# Patient Record
Sex: FEMALE | Race: WHITE | NOT HISPANIC OR LATINO | Employment: OTHER | URBAN - METROPOLITAN AREA
[De-identification: names, ages, dates, MRNs, and addresses within clinical notes are randomized per-mention and may not be internally consistent; named-entity substitution may affect disease eponyms.]

---

## 2017-02-01 ENCOUNTER — ALLIED HEALTH/NURSE VISIT (OUTPATIENT)
Dept: NURSING | Facility: CLINIC | Age: 82
End: 2017-02-01
Payer: COMMERCIAL

## 2017-02-01 ENCOUNTER — TELEPHONE (OUTPATIENT)
Dept: FAMILY MEDICINE | Facility: CLINIC | Age: 82
End: 2017-02-01

## 2017-02-01 DIAGNOSIS — Z11.1 SCREENING FOR TUBERCULOSIS: Primary | ICD-10-CM

## 2017-02-01 PROCEDURE — 86580 TB INTRADERMAL TEST: CPT

## 2017-02-01 PROCEDURE — 99207 ZZC NO CHARGE NURSE ONLY: CPT

## 2017-02-01 NOTE — NURSING NOTE
The following medication was given:     MEDICATION: Tuberculin Purified Protein  ROUTE: ID  SITE: Forearm - Left  DOSE: 0.1 mL  LOT #: [id number]  :  [employer]  EXPIRATION DATE:  01/06/2018  NDC#: 61636-973-34

## 2017-02-01 NOTE — TELEPHONE ENCOUNTER
Patient dropped off forms for assisted living.  Please call patient when completed and after faxed.  Forms placed in provider's in basket.  LINNEA Bower CMA February 1, 2017 3:07 PM

## 2017-02-02 NOTE — TELEPHONE ENCOUNTER
I'm just waiting for her to have her PPD read on Friday and then we can complete for forms.  They are on my desk in yellow folder so when she comes in tomorrow for PPD to be read, that part can be completed and then pt can take forms after we make copy for chart.

## 2017-02-02 NOTE — TELEPHONE ENCOUNTER
Patient daughter (Kaycee) call and would like a call back to make sure the correct forms were drought in. Her number is [phone]

## 2017-02-03 ENCOUNTER — ALLIED HEALTH/NURSE VISIT (OUTPATIENT)
Dept: NURSING | Facility: CLINIC | Age: 82
End: 2017-02-03
Payer: COMMERCIAL

## 2017-02-03 DIAGNOSIS — Z11.1 SCREENING EXAMINATION FOR PULMONARY TUBERCULOSIS: Primary | ICD-10-CM

## 2017-02-03 LAB
PPDINDURATION: 0 MM (ref 0–5)
PPDREDNESS: NORMAL MM

## 2017-02-03 PROCEDURE — 99207 ZZC NO CHARGE NURSE ONLY: CPT

## 2017-05-11 ENCOUNTER — TELEPHONE (OUTPATIENT)
Dept: FAMILY MEDICINE | Facility: CLINIC | Age: 82
End: 2017-05-11

## 2017-05-11 DIAGNOSIS — Z71.89 ADVANCED DIRECTIVES, COUNSELING/DISCUSSION: Primary | ICD-10-CM

## 2017-05-11 DIAGNOSIS — Z71.89 ACP (ADVANCE CARE PLANNING): ICD-10-CM

## 2017-05-11 NOTE — TELEPHONE ENCOUNTER
Reason for Call:  Other forms    Detailed comments: patient just moved into senior living. daughter Dimple dropped off 2 forms at the clinic on Wednesday, one for  ok to participate in fitness classes and the other a POLST form(she thinks) she wants to pick them up by tomorrow as she is leaving town. Please advise.     Phone Number Patient can be reached at: Other phone number:  [phone] # april Musa     Best Time: asap    Can we leave a detailed message on this number? YES    Call taken on 5/11/2017 at 4:11 PM by Leila Arce

## 2017-05-11 NOTE — TELEPHONE ENCOUNTER
Forms dropped with Dianelys Demarco so POLST can be discussed  Please see if Kathy can complete tomorrow

## 2017-05-12 PROBLEM — Z71.89 ACP (ADVANCE CARE PLANNING): Status: ACTIVE | Noted: 2017-05-12

## 2017-05-12 NOTE — TELEPHONE ENCOUNTER
Called daughter to discuss, completed polst, and Jennifer Weiss signed.  Left at  for daughter to .  Placed Full code order.  Updated Problem list  Kathy Dawn RN

## 2017-08-30 ENCOUNTER — DOCUMENTATION ONLY (OUTPATIENT)
Dept: OTHER | Facility: CLINIC | Age: 82
End: 2017-08-30

## 2017-08-30 DIAGNOSIS — Z71.89 ACP (ADVANCE CARE PLANNING): ICD-10-CM

## 2017-11-30 ENCOUNTER — OFFICE VISIT (OUTPATIENT)
Dept: FAMILY MEDICINE | Facility: CLINIC | Age: 82
End: 2017-11-30
Payer: COMMERCIAL

## 2017-11-30 VITALS
DIASTOLIC BLOOD PRESSURE: 60 MMHG | BODY MASS INDEX: 19.54 KG/M2 | HEIGHT: 62 IN | WEIGHT: 106.2 LBS | RESPIRATION RATE: 18 BRPM | TEMPERATURE: 97.9 F | SYSTOLIC BLOOD PRESSURE: 117 MMHG | HEART RATE: 66 BPM | OXYGEN SATURATION: 99 %

## 2017-11-30 DIAGNOSIS — R63.4 UNINTENDED WEIGHT LOSS: ICD-10-CM

## 2017-11-30 DIAGNOSIS — R30.0 DYSURIA: Primary | ICD-10-CM

## 2017-11-30 DIAGNOSIS — J06.9 UPPER RESPIRATORY TRACT INFECTION, UNSPECIFIED TYPE: ICD-10-CM

## 2017-11-30 LAB
ALBUMIN UR-MCNC: 30 MG/DL
APPEARANCE UR: ABNORMAL
BACTERIA #/AREA URNS HPF: ABNORMAL /HPF
BILIRUB UR QL STRIP: NEGATIVE
COLOR UR AUTO: YELLOW
ERYTHROCYTE [DISTWIDTH] IN BLOOD BY AUTOMATED COUNT: 12.2 % (ref 10–15)
GLUCOSE UR STRIP-MCNC: NEGATIVE MG/DL
HCT VFR BLD AUTO: 37.2 % (ref 35–47)
HGB BLD-MCNC: 11.8 G/DL (ref 11.7–15.7)
HGB UR QL STRIP: ABNORMAL
KETONES UR STRIP-MCNC: NEGATIVE MG/DL
LEUKOCYTE ESTERASE UR QL STRIP: ABNORMAL
MCH RBC QN AUTO: 33.4 PG (ref 26.5–33)
MCHC RBC AUTO-ENTMCNC: 31.7 G/DL (ref 31.5–36.5)
MCV RBC AUTO: 105 FL (ref 78–100)
NITRATE UR QL: POSITIVE
PH UR STRIP: 5.5 PH (ref 5–7)
PLATELET # BLD AUTO: 200 10E9/L (ref 150–450)
RBC # BLD AUTO: 3.53 10E12/L (ref 3.8–5.2)
RBC #/AREA URNS AUTO: >100 /HPF
SOURCE: ABNORMAL
SP GR UR STRIP: 1.02 (ref 1–1.03)
UROBILINOGEN UR STRIP-ACNC: 0.2 EU/DL (ref 0.2–1)
WBC # BLD AUTO: 5.7 10E9/L (ref 4–11)
WBC #/AREA URNS AUTO: >100 /HPF

## 2017-11-30 PROCEDURE — 87088 URINE BACTERIA CULTURE: CPT | Performed by: PHYSICIAN ASSISTANT

## 2017-11-30 PROCEDURE — 85027 COMPLETE CBC AUTOMATED: CPT | Performed by: PHYSICIAN ASSISTANT

## 2017-11-30 PROCEDURE — 80048 BASIC METABOLIC PNL TOTAL CA: CPT | Performed by: PHYSICIAN ASSISTANT

## 2017-11-30 PROCEDURE — 87186 SC STD MICRODIL/AGAR DIL: CPT | Performed by: PHYSICIAN ASSISTANT

## 2017-11-30 PROCEDURE — 36415 COLL VENOUS BLD VENIPUNCTURE: CPT | Performed by: PHYSICIAN ASSISTANT

## 2017-11-30 PROCEDURE — 87086 URINE CULTURE/COLONY COUNT: CPT | Performed by: PHYSICIAN ASSISTANT

## 2017-11-30 PROCEDURE — 99214 OFFICE O/P EST MOD 30 MIN: CPT | Performed by: PHYSICIAN ASSISTANT

## 2017-11-30 PROCEDURE — 84443 ASSAY THYROID STIM HORMONE: CPT | Performed by: PHYSICIAN ASSISTANT

## 2017-11-30 PROCEDURE — 81001 URINALYSIS AUTO W/SCOPE: CPT | Performed by: PHYSICIAN ASSISTANT

## 2017-11-30 RX ORDER — SULFAMETHOXAZOLE/TRIMETHOPRIM 800-160 MG
1 TABLET ORAL 2 TIMES DAILY
Qty: 14 TABLET | Refills: 0 | Status: SHIPPED | OUTPATIENT
Start: 2017-11-30 | End: 2018-05-01

## 2017-11-30 NOTE — MR AVS SNAPSHOT
"              After Visit Summary   2017    Selena Jacobo    MRN: [id number]           Patient Information     Date Of Birth          1930        Visit Information        Provider Department      2017 12:30 PM Tana Garcia PA-C [facility]        Today's Diagnoses     Dysuria    -  1    Unintended weight loss        Upper respiratory tract infection, unspecified type           Follow-ups after your visit        Who to contact     If you have questions or need follow up information about today's clinic visit or your schedule please contact [facility] directly at [phone].  Normal or non-critical lab and imaging results will be communicated to you by Thoundshart, letter or phone within 4 business days after the clinic has received the results. If you do not hear from us within 7 days, please contact the clinic through Thoundshart or phone. If you have a critical or abnormal lab result, we will notify you by phone as soon as possible.  Submit refill requests through PlayHaven or call your pharmacy and they will forward the refill request to us. Please allow 3 business days for your refill to be completed.          Additional Information About Your Visit        MyChart Information     PlayHaven lets you send messages to your doctor, view your test results, renew your prescriptions, schedule appointments and more. To sign up, go to [email or web address] . Click on \"Log in\" on the left side of the screen, which will take you to the Welcome page. Then click on \"Sign up Now\" on the right side of the page.     You will be asked to enter the access code listed below, as well as some personal information. Please follow the directions to create your username and password.     Your access code is: [email or web address]  Expires: 2018  1:03 PM     Your access code will  in 90 days. If you need help or a new code, please call your [facility] or [phone].        Care EveryWhere ID  " "   This is your Care EveryWhere ID. This could be used by other organizations to access your [facility] medical records  [id number]        Your Vitals Were     Pulse Temperature Respirations Height Pulse Oximetry BMI (Body Mass Index)    66 97.9  F (36.6  C) (Tympanic) 18 5' 2.4\" (1.585 m) 99% 19.17 kg/m2       Blood Pressure from Last 3 Encounters:   11/30/17 117/60   12/08/16 123/63   06/25/15 148/65    Weight from Last 3 Encounters:   11/30/17 106 lb 3.2 oz (48.2 kg)   12/08/16 116 lb 1.6 oz (52.7 kg)   06/25/15 118 lb (53.5 kg)              We Performed the Following     *UA reflex to Microscopic and Culture ([place] [facility] (except [place] and [place])     Basic metabolic panel     CBC with platelets     TSH with free T4 reflex     Urine Culture Aerobic Bacterial     Urine Microscopic          Today's Medication Changes          These changes are accurate as of: 11/30/17  1:03 PM.  If you have any questions, ask your nurse or doctor.               Start taking these medicines.        Dose/Directions    sulfamethoxazole-trimethoprim 800-160 MG per tablet   Commonly known as:  BACTRIM DS/SEPTRA DS   Used for:  Dysuria   Started by:  Tana Garcia PA-C        Dose:  1 tablet   Take 1 tablet by mouth 2 times daily   Quantity:  14 tablet   Refills:  0            Where to get your medicines      These medications were sent to [facility] [address] [place] [address] SYD DONALD  [address] AT [address] [place] [address] & [facility]  [address] [address]    Hours:  24-hours Phone:  [phone]     sulfamethoxazole-trimethoprim 800-160 MG per tablet                Primary Care Provider Office Phone # Fax #    Tana Garcia PA-C [phone] [address]  [place] [address]        Equal Access to Services     [facility] ETHAN AH: Ivon pearce Soaquiles, waaxda luqadaha, qaybta kaalmada adeegyamalena, vicki parish ah. So [facility] [phone].    ATENCIÓN: " Si habla dacia, tiene a craft disposición servicios gratuitos de asistencia lingüística. Mike vilchis [phone].    We comply with applicable federal civil rights laws and [place] laws. We do not discriminate on the basis of race, color, national origin, age, disability, sex, sexual orientation, or gender identity.            Thank you!     Thank you for choosing [facility]  for your care. Our goal is always to provide you with excellent care. Hearing back from our patients is one way we can continue to improve our services. Please take a few minutes to complete the written survey that you may receive in the mail after your visit with us. Thank you!             Your Updated Medication List - Protect others around you: Learn how to safely use, store and throw away your medicines at www.disposemymeds.org.          This list is accurate as of: 11/30/17  1:03 PM.  Always use your most recent med list.                   Brand Name Dispense Instructions for use Diagnosis    aspirin 81 MG tablet     30 tablet    Take 1 tablet (81 mg) by mouth daily        CENTRUM SILVER per tablet      Take 1 tablet by mouth daily        FISH OIL PO      Take  by mouth.        magnesium 250 MG tablet      Take 1 tablet by mouth daily        sulfamethoxazole-trimethoprim 800-160 MG per tablet    BACTRIM DS/SEPTRA DS    14 tablet    Take 1 tablet by mouth 2 times daily    Dysuria       VITAMIN D PO      Take  by mouth.

## 2017-11-30 NOTE — PROGRESS NOTES
"HPI: Pt moved to [facility] in April  Here with dtr Dimple  Her wt is down to 106lbs from 116lbs last year.  She has had a cold and cough x 2-3 weeks.  Yesterday had urinary dysuria and freq  That feels better today  Dtr  age 66 yo brain ca in August of this year.  She has one Ensure per day  She is walking 2 miles per day  Denies fever  Cough is mostly nonprod  She has more of a runny nose  Denies sob or pain in chest  She didn't want to try Mucinex DM  She does eat a banana per day.    Past Medical History:   Diagnosis Date     Generalized OA      GERD (gastroesophageal reflux disease)      Hyperlipidemia      Osteoporosis      Rotator cuff tear      UPJ stricture, acquired     L retrograde uterteogram, balloon dilation     Past Surgical History:   Procedure Laterality Date     CATARACT IOL, RT/LT       Social History   Substance Use Topics     Smoking status: Never Smoker     Smokeless tobacco: Never Used     Alcohol use 0.0 oz/week     0 Standard drinks or equivalent per week      Comment: rare glass of wine     Current Outpatient Prescriptions   Medication Sig Dispense Refill     sulfamethoxazole-trimethoprim (BACTRIM DS/SEPTRA DS) 800-160 MG per tablet Take 1 tablet by mouth 2 times daily 14 tablet 0     Multiple Vitamins-Minerals (CENTRUM SILVER) per tablet Take 1 tablet by mouth daily       aspirin 81 MG tablet Take 1 tablet (81 mg) by mouth daily 30 tablet      magnesium 250 MG tablet Take 1 tablet by mouth daily       Omega-3 Fatty Acids (FISH OIL PO) Take  by mouth.       Cholecalciferol (VITAMIN D PO) Take  by mouth.       Allergies   Allergen Reactions     Fosamax [Alendronate Sodium]      Penicillins      fever     FAMILY HISTORY NOTED AND REVIEWED  PHYSICAL EXAM:    /60 (BP Location: Right arm, Patient Position: Chair, Cuff Size: Adult Regular)  Pulse 66  Temp 97.9  F (36.6  C) (Tympanic)  Resp 18  Ht 5' 2.4\" (1.585 m)  Wt 106 lb 3.2 oz (48.2 kg)  SpO2 99%  BMI 19.17 " "kg/m2    Patient appears non toxic  Thin, alert, oriented, walks independently  Ears: TMs pearly grey  Throat: no erythema or exudate  Lungs: CTA bilat  Heart: RRR  Extr: no edema.  Psych: appears down    Results for orders placed or performed in visit on 11/30/17   *UA reflex to Microscopic and Culture ([facility] (except [place] and [place])   Result Value Ref Range    Color Urine Yellow     Appearance Urine Cloudy     Glucose Urine Negative NEG^Negative mg/dL    Bilirubin Urine Negative NEG^Negative    Ketones Urine Negative NEG^Negative mg/dL    Specific Gravity Urine 1.020 1.003 - 1.035    Blood Urine Moderate (A) NEG^Negative    pH Urine 5.5 5.0 - 7.0 pH    Protein Albumin Urine 30 (A) NEG^Negative mg/dL    Urobilinogen Urine 0.2 0.2 - 1.0 EU/dL    Nitrite Urine Positive (A) NEG^Negative    Leukocyte Esterase Urine Large (A) NEG^Negative    Source Midstream Urine    Urine Microscopic   Result Value Ref Range    WBC Urine >100 (A) OTO2^O - 2 /HPF    RBC Urine >100 (A) OTO2^O - 2 /HPF    Bacteria Urine Many (A) NEG^Negative /HPF     Assessment and Plan:     (R30.0) Dysuria  (primary encounter diagnosis)  Comment: UA positive.  Start bactrim ds one bid  7d. Push fluids. UC sent.  Plan: *UA reflex to Microscopic and Culture ([facility] (except [place] and         [place]), Urine Culture Aerobic Bacterial,         Urine Microscopic,         sulfamethoxazole-trimethoprim (BACTRIM         DS/SEPTRA DS) 800-160 MG per tablet            (R63.4) Unintended weight loss  Comment: suspect due to eating less. She moved and dtr passed away this year.  Pt not a \"pill person\" and doesn't want to consider medication for possible depression  Plan: TSH with free T4 reflex, Basic metabolic panel            (J06.9) Upper respiratory tract infection, unspecified type  Comment:   Plan: CBC with platelets              Tana Garcia PA-C            "

## 2017-12-01 LAB
ANION GAP SERPL CALCULATED.3IONS-SCNC: 9 MMOL/L (ref 3–14)
BUN SERPL-MCNC: 25 MG/DL (ref 7–30)
CALCIUM SERPL-MCNC: 9.2 MG/DL (ref 8.5–10.1)
CHLORIDE SERPL-SCNC: 106 MMOL/L (ref 94–109)
CO2 SERPL-SCNC: 26 MMOL/L (ref 20–32)
CREAT SERPL-MCNC: 0.67 MG/DL (ref 0.52–1.04)
GFR SERPL CREATININE-BSD FRML MDRD: 83 ML/MIN/1.7M2
GLUCOSE SERPL-MCNC: 102 MG/DL (ref 70–99)
POTASSIUM SERPL-SCNC: 4.3 MMOL/L (ref 3.4–5.3)
SODIUM SERPL-SCNC: 141 MMOL/L (ref 133–144)
TSH SERPL DL<=0.005 MIU/L-ACNC: 2.9 MU/L (ref 0.4–4)

## 2017-12-02 LAB
BACTERIA SPEC CULT: ABNORMAL
SPECIMEN SOURCE: ABNORMAL

## 2018-04-29 DIAGNOSIS — R30.0 DYSURIA: ICD-10-CM

## 2018-04-30 NOTE — TELEPHONE ENCOUNTER
"Last Written Prescription Date:  11/30/2017  Last Fill Quantity: 14,  # refills: 0   Last office visit: 11/30/2017 with prescribing provider:     Future Office Visit:      Requested Prescriptions   Pending Prescriptions Disp Refills     sulfamethoxazole-trimethoprim (BACTRIM DS/SEPTRA DS) 800-160 MG per tablet [Pharmacy Med Name: SULFAMETH//160MG TB] 14 tablet 0     Sig: TAKE 1 TABLET BY MOUTH TWICE DAILY    Oral Acne/Rosacea Medications Protocol Failed    4/30/2018 11:58 AM       Failed - Confirmation of diagnosis is required    Please confirm diagnosis is acne or rosacea.     If NOT acne or rosacea; refer request to provider for further evaluation.    If diagnosis IS acne or rosacea, OK to refill BASED ON PREVIOUS REFILL CLINICAL NOTE RECOMMENDATION.         Passed - Patient is 12 years of age or older       Passed - Recent (12 mo) or future (30 days) visit within the authorizing provider's specialty    Patient had office visit in the last 12 months or has a visit in the next 30 days with authorizing provider or within the authorizing provider's specialty.  See \"Patient Info\" tab in inbasket, or \"Choose Columns\" in Meds & Orders section of the refill encounter.           Passed - No active pregnancy on record       Passed - No positive prenancy test is past 12 months          "

## 2018-04-30 NOTE — TELEPHONE ENCOUNTER
Pt daughter derick is calling to check status of refill. Advise that refill is in process. Please advise

## 2018-05-01 ENCOUNTER — OFFICE VISIT (OUTPATIENT)
Dept: FAMILY MEDICINE | Facility: CLINIC | Age: 83
End: 2018-05-01
Payer: COMMERCIAL

## 2018-05-01 VITALS
BODY MASS INDEX: 20.38 KG/M2 | WEIGHT: 115 LBS | OXYGEN SATURATION: 98 % | DIASTOLIC BLOOD PRESSURE: 60 MMHG | TEMPERATURE: 98.8 F | HEIGHT: 63 IN | SYSTOLIC BLOOD PRESSURE: 122 MMHG | HEART RATE: 73 BPM

## 2018-05-01 DIAGNOSIS — R30.0 DYSURIA: ICD-10-CM

## 2018-05-01 DIAGNOSIS — N30.01 ACUTE CYSTITIS WITH HEMATURIA: Primary | ICD-10-CM

## 2018-05-01 LAB
ALBUMIN UR-MCNC: 100 MG/DL
APPEARANCE UR: ABNORMAL
BACTERIA #/AREA URNS HPF: ABNORMAL /HPF
BILIRUB UR QL STRIP: NEGATIVE
COLOR UR AUTO: YELLOW
GLUCOSE UR STRIP-MCNC: NEGATIVE MG/DL
HGB UR QL STRIP: ABNORMAL
KETONES UR STRIP-MCNC: NEGATIVE MG/DL
LEUKOCYTE ESTERASE UR QL STRIP: ABNORMAL
NITRATE UR QL: POSITIVE
PH UR STRIP: 5.5 PH (ref 5–7)
RBC #/AREA URNS AUTO: ABNORMAL /HPF
SOURCE: ABNORMAL
SP GR UR STRIP: 1.02 (ref 1–1.03)
UROBILINOGEN UR STRIP-ACNC: 0.2 EU/DL (ref 0.2–1)
WBC #/AREA URNS AUTO: >100 /HPF

## 2018-05-01 PROCEDURE — 87088 URINE BACTERIA CULTURE: CPT | Performed by: NURSE PRACTITIONER

## 2018-05-01 PROCEDURE — 81001 URINALYSIS AUTO W/SCOPE: CPT | Performed by: NURSE PRACTITIONER

## 2018-05-01 PROCEDURE — 87186 SC STD MICRODIL/AGAR DIL: CPT | Performed by: NURSE PRACTITIONER

## 2018-05-01 PROCEDURE — 87086 URINE CULTURE/COLONY COUNT: CPT | Performed by: NURSE PRACTITIONER

## 2018-05-01 PROCEDURE — 99213 OFFICE O/P EST LOW 20 MIN: CPT | Performed by: NURSE PRACTITIONER

## 2018-05-01 RX ORDER — SULFAMETHOXAZOLE/TRIMETHOPRIM 800-160 MG
TABLET ORAL
Refills: 0
Start: 2018-05-01

## 2018-05-01 RX ORDER — CIPROFLOXACIN 500 MG/1
500 TABLET, FILM COATED ORAL 2 TIMES DAILY
Qty: 10 TABLET | Refills: 0 | Status: SHIPPED | OUTPATIENT
Start: 2018-05-01 | End: 2018-11-12

## 2018-05-01 NOTE — PROGRESS NOTES
SUBJECTIVE:   Selena Jacobo is a 88 year old female who presents to clinic today for the following health issues:      URINARY TRACT SYMPTOMS      Duration: 3 days    Description  Dysuria, urgency    Intensity:  moderate    Accompanying signs and symptoms:  Fever/chills: no   Flank pain no   Nausea and vomiting: no   Vaginal symptoms: none  Abdominal/Pelvic Pain: no     History  History of frequent UTI's: YES  History of kidney stones: no   Sexually Active: no   Possibility of pregnancy: No    Precipitating or alleviating factors: None    Therapies tried and outcome: none   Outcome: NA      Problem list and histories reviewed & adjusted, as indicated.  Additional history: as documented    Patient Active Problem List   Diagnosis     Osteoporosis     CARDIOVASCULAR SCREENING; LDL GOAL LESS THAN 160     Generalized OA     Rotator cuff tear     UPJ stricture, acquired     GERD (gastroesophageal reflux disease)     ACP (advance care planning)     Past Surgical History:   Procedure Laterality Date     CATARACT IOL, RT/LT         Social History   Substance Use Topics     Smoking status: Never Smoker     Smokeless tobacco: Never Used     Alcohol use 0.0 oz/week     0 Standard drinks or equivalent per week      Comment: rare glass of wine     Family History   Problem Relation Age of Onset     Alzheimer Disease Mother      Brain Cancer Daughter       age 65         Current Outpatient Prescriptions   Medication Sig Dispense Refill     aspirin 81 MG tablet Take 1 tablet (81 mg) by mouth daily 30 tablet      Cholecalciferol (VITAMIN D PO) Take  by mouth.       magnesium 250 MG tablet Take 1 tablet by mouth daily       Multiple Vitamins-Minerals (CENTRUM SILVER) per tablet Take 1 tablet by mouth daily       Omega-3 Fatty Acids (FISH OIL PO) Take  by mouth.       Allergies   Allergen Reactions     Fosamax [Alendronate Sodium]      Penicillins      fever       Reviewed and updated as needed this visit by clinical staff      "  Reviewed and updated as needed this visit by Provider         ROS:  Constitutional, HEENT, cardiovascular, pulmonary, gi and gu systems are negative, except as otherwise noted.    OBJECTIVE:     /60  Pulse 73  Temp 98.8  F (37.1  C) (Oral)  Ht 5' 2.5\" (1.588 m)  Wt 115 lb (52.2 kg)  SpO2 98%  Breastfeeding? No  BMI 20.7 kg/m2  Body mass index is 20.7 kg/(m^2).  GENERAL: healthy, alert and no distress  ABDOMEN: soft, nontender, BACK: no CVA tenderness,  Diagnostic Test Results:  Results for orders placed or performed in visit on 05/01/18 (from the past 24 hour(s))   *UA reflex to Microscopic and Culture ([facility] (except [place] and [place])   Result Value Ref Range    Color Urine Yellow     Appearance Urine Cloudy     Glucose Urine Negative NEG^Negative mg/dL    Bilirubin Urine Negative NEG^Negative    Ketones Urine Negative NEG^Negative mg/dL    Specific Gravity Urine 1.020 1.003 - 1.035    Blood Urine Moderate (A) NEG^Negative    pH Urine 5.5 5.0 - 7.0 pH    Protein Albumin Urine 100 (A) NEG^Negative mg/dL    Urobilinogen Urine 0.2 0.2 - 1.0 EU/dL    Nitrite Urine Positive (A) NEG^Negative    Leukocyte Esterase Urine Moderate (A) NEG^Negative    Source Midstream Urine    Urine Microscopic   Result Value Ref Range    WBC Urine >100 (A) OTO5^0 - 5 /HPF    RBC Urine O - 2 OTO2^O - 2 /HPF    Bacteria Urine Few (A) NEG^Negative /HPF       ASSESSMENT/PLAN:       ICD-10-CM    1. Acute cystitis with hematuria N30.01 ciprofloxacin (CIPRO) 500 MG tablet   2. Dysuria R30.0 *UA reflex to Microscopic and Culture ([facility] (except [place] and [place])     Urine Microscopic     Urine Culture Aerobic Bacterial         Patient Instructions      * BLADDER INFECTION,Female (Adult)    A bladder infection (\"cystitis\" or \"UTI\") usually causes a constant urge to urinate and a burning when passing urine. Urine may be cloudy, smelly or dark. There may be pain in the lower abdomen. " A bladder infection occurs when bacteria from the vaginal area enter the bladder opening (urethra). This can occur from sexual intercourse, wearing tight clothing, dehydration and other factors.  HOME CARE:  1. Drink lots of fluids (at least 6-8 glasses a day, unless you must restrict fluids for other medical reasons). This will force the medicine into your urinary system and flush the bacteria out of your body. Cranberry juice has been shown to help clear out the bacteria.  2. Avoid sexual intercourse until your symptoms are gone.  3. A bladder infection is treated with antibiotics. You may also be given Pyridium (generic = phenazopyridine) to reduce the burning sensation. This medicine will cause your urine to become a bright orange color. The orange urine may stain clothing. You may wear a pad or panty-liner to protect clothing.  PREVENTING FUTURE INFECTIONS:  1. Always wipe from front to back after a bowel movement.  2. Keep the genital area clean and dry.  3. Drink plenty of fluids each day to avoid dehydration.  4. Urinate right after intercourse to flush out the bladder.  5. Wear cotton underwear and cotton-lined panty hose; avoid tight-fitting pants.  6. If you are on birth control pills and are having frequent bladder infections, discuss with your doctor.  FOLLOW UP: Return to this facility or see your doctor if ALL symptoms are not gone after three days of treatment.  GET PROMPT MEDICAL ATTENTION if any of the following occur:    Fever over 101 F (38.3 C)    No improvement by the third day of treatment    Increasing back or abdominal pain    Repeated vomiting; unable to keep medicine down    Weakness, dizziness or fainting    Vaginal discharge    Pain, redness or swelling in the labia (outer vaginal area)    6176-3879 [employer]. [address] [place] [address]. All rights reserved. This information is not intended as a substitute for professional medical care. Always follow your  healthcare professional's instructions.  This information has been modified by your health care provider with permission from the publisher.        CHARLES Santamaria [facility]

## 2018-05-01 NOTE — PATIENT INSTRUCTIONS
"   * BLADDER INFECTION,Female (Adult)    A bladder infection (\"cystitis\" or \"UTI\") usually causes a constant urge to urinate and a burning when passing urine. Urine may be cloudy, smelly or dark. There may be pain in the lower abdomen. A bladder infection occurs when bacteria from the vaginal area enter the bladder opening (urethra). This can occur from sexual intercourse, wearing tight clothing, dehydration and other factors.  HOME CARE:  1. Drink lots of fluids (at least 6-8 glasses a day, unless you must restrict fluids for other medical reasons). This will force the medicine into your urinary system and flush the bacteria out of your body. Cranberry juice has been shown to help clear out the bacteria.  2. Avoid sexual intercourse until your symptoms are gone.  3. A bladder infection is treated with antibiotics. You may also be given Pyridium (generic = phenazopyridine) to reduce the burning sensation. This medicine will cause your urine to become a bright orange color. The orange urine may stain clothing. You may wear a pad or panty-liner to protect clothing.  PREVENTING FUTURE INFECTIONS:  1. Always wipe from front to back after a bowel movement.  2. Keep the genital area clean and dry.  3. Drink plenty of fluids each day to avoid dehydration.  4. Urinate right after intercourse to flush out the bladder.  5. Wear cotton underwear and cotton-lined panty hose; avoid tight-fitting pants.  6. If you are on birth control pills and are having frequent bladder infections, discuss with your doctor.  FOLLOW UP: Return to this facility or see your doctor if ALL symptoms are not gone after three days of treatment.  GET PROMPT MEDICAL ATTENTION if any of the following occur:    Fever over 101 F (38.3 C)    No improvement by the third day of treatment    Increasing back or abdominal pain    Repeated vomiting; unable to keep medicine down    Weakness, dizziness or fainting    Vaginal discharge    Pain, redness or swelling in " the labia (outer vaginal area)    0548-1068 [employer]. [address] [place] [address], [place] [address]. All rights reserved. This information is not intended as a substitute for professional medical care. Always follow your healthcare professional's instructions.  This information has been modified by your health care provider with permission from the publisher.

## 2018-05-01 NOTE — LETTER
[facility]  [address] [facility] [facility]  [address]  [place]  [address]  Tel: [phone]    May 3, 2018    Selena Jacobo  [address]  [facility] [address]        Dear Ms. Jacobo,    The cipro is a good antibiotic for this urinary tract infection.     If you have any further questions or problems, please contact our office.      Sincerely,    Jennifer Weiss, NP/cesar    Results for orders placed or performed in visit on 05/01/18   *UA reflex to Microscopic and Culture ([facility] (except [place] and [place])   Result Value Ref Range    Color Urine Yellow     Appearance Urine Cloudy     Glucose Urine Negative NEG^Negative mg/dL    Bilirubin Urine Negative NEG^Negative    Ketones Urine Negative NEG^Negative mg/dL    Specific Gravity Urine 1.020 1.003 - 1.035    Blood Urine Moderate (A) NEG^Negative    pH Urine 5.5 5.0 - 7.0 pH    Protein Albumin Urine 100 (A) NEG^Negative mg/dL    Urobilinogen Urine 0.2 0.2 - 1.0 EU/dL    Nitrite Urine Positive (A) NEG^Negative    Leukocyte Esterase Urine Moderate (A) NEG^Negative    Source Midstream Urine    Urine Microscopic   Result Value Ref Range    WBC Urine >100 (A) OTO5^0 - 5 /HPF    RBC Urine O - 2 OTO2^O - 2 /HPF    Bacteria Urine Few (A) NEG^Negative /HPF   Urine Culture Aerobic Bacterial   Result Value Ref Range    Specimen Description Midstream Urine     Culture Micro >100,000 colonies/mL  Citrobacter koseri   (A)        Susceptibility    Citrobacter koseri - CLOVIS     AMPICILLIN* >=32 Resistant ug/mL      * Intrinsically Resistant     CEFAZOLIN* <=4 Sensitive ug/mL      * Cefazolin CLOVIS breakpoints are for the treatment of uncomplicated urinary tract infections.  For the treatment of systemic infections, please contact the laboratory for additional testing.     CEFOXITIN <=4 Sensitive ug/mL     CEFTAZIDIME <=1 Sensitive ug/mL     CEFTRIAXONE <=1 Sensitive ug/mL     CIPROFLOXACIN <=0.25 Sensitive ug/mL     GENTAMICIN <=1 Sensitive ug/mL      LEVOFLOXACIN <=0.12 Sensitive ug/mL     NITROFURANTOIN <=16 Sensitive ug/mL     TOBRAMYCIN <=1 Sensitive ug/mL     Trimethoprim/Sulfa <=1/19 Sensitive ug/mL     AMPICILLIN/SULBACTAM <=2 Sensitive ug/mL     Piperacillin/Tazo <=4 Sensitive ug/mL     CEFEPIME <=1 Sensitive ug/mL           Enclosure: Lab Results

## 2018-05-01 NOTE — MR AVS SNAPSHOT
"              After Visit Summary   5/1/2018    Selena Jacobo    MRN: [id number]           Patient Information     Date Of Birth          1/11/1930        Visit Information        Provider Department      5/1/2018 2:30 PM Jennifer Weiss APRN [facility]        Today's Diagnoses     Acute cystitis with hematuria    -  1    Dysuria          Care Instructions       * BLADDER INFECTION,Female (Adult)    A bladder infection (\"cystitis\" or \"UTI\") usually causes a constant urge to urinate and a burning when passing urine. Urine may be cloudy, smelly or dark. There may be pain in the lower abdomen. A bladder infection occurs when bacteria from the vaginal area enter the bladder opening (urethra). This can occur from sexual intercourse, wearing tight clothing, dehydration and other factors.  HOME CARE:  1. Drink lots of fluids (at least 6-8 glasses a day, unless you must restrict fluids for other medical reasons). This will force the medicine into your urinary system and flush the bacteria out of your body. Cranberry juice has been shown to help clear out the bacteria.  2. Avoid sexual intercourse until your symptoms are gone.  3. A bladder infection is treated with antibiotics. You may also be given Pyridium (generic = phenazopyridine) to reduce the burning sensation. This medicine will cause your urine to become a bright orange color. The orange urine may stain clothing. You may wear a pad or panty-liner to protect clothing.  PREVENTING FUTURE INFECTIONS:  1. Always wipe from front to back after a bowel movement.  2. Keep the genital area clean and dry.  3. Drink plenty of fluids each day to avoid dehydration.  4. Urinate right after intercourse to flush out the bladder.  5. Wear cotton underwear and cotton-lined panty hose; avoid tight-fitting pants.  6. If you are on birth control pills and are having frequent bladder infections, discuss with your doctor.  FOLLOW UP: Return to this facility or see your " "doctor if ALL symptoms are not gone after three days of treatment.  GET PROMPT MEDICAL ATTENTION if any of the following occur:    Fever over 101 F (38.3 C)    No improvement by the third day of treatment    Increasing back or abdominal pain    Repeated vomiting; unable to keep medicine down    Weakness, dizziness or fainting    Vaginal discharge    Pain, redness or swelling in the labia (outer vaginal area)    1659-2070 [employer]. [address] [place] [address], [place] [address]. All rights reserved. This information is not intended as a substitute for professional medical care. Always follow your healthcare professional's instructions.  This information has been modified by your health care provider with permission from the publisher.            Follow-ups after your visit        Who to contact     If you have questions or need follow up information about today's clinic visit or your schedule please contact [facility] directly at [phone].  Normal or non-critical lab and imaging results will be communicated to you by MyChart, letter or phone within 4 business days after the clinic has received the results. If you do not hear from us within 7 days, please contact the clinic through [employer] or phone. If you have a critical or abnormal lab result, we will notify you by phone as soon as possible.  Submit refill requests through [employer] or call your pharmacy and they will forward the refill request to us. Please allow 3 business days for your refill to be completed.          Additional Information About Your Visit        [employer] Information     [employer] lets you send messages to your doctor, view your test results, renew your prescriptions, schedule appointments and more. To sign up, go to [email or web address] . Click on \"Log in\" on the left side of the screen, which will take you to the Welcome page. Then click on \"Sign up Now\" on the right side of the page.     You will be asked to enter " "the access code listed below, as well as some personal information. Please follow the directions to create your username and password.     Your access code is: [email or web address]  Expires: 2018  3:17 PM     Your access code will  in 90 days. If you need help or a new code, please call your [facility] or [phone].        Care EveryWhere ID     This is your Care EveryWhere ID. This could be used by other organizations to access your [facility] records  [id number]        Your Vitals Were     Pulse Temperature Height Pulse Oximetry Breastfeeding? BMI (Body Mass Index)    73 98.8  F (37.1  C) (Oral) 5' 2.5\" (1.588 m) 98% No 20.7 kg/m2       Blood Pressure from Last 3 Encounters:   18 122/60   17 117/60   16 123/63    Weight from Last 3 Encounters:   18 115 lb (52.2 kg)   17 106 lb 3.2 oz (48.2 kg)   16 116 lb 1.6 oz (52.7 kg)              We Performed the Following     *UA reflex to Microscopic and Culture ([facility] (except [place] and [place])     Urine Culture Aerobic Bacterial     Urine Microscopic          Today's Medication Changes          These changes are accurate as of 18  3:17 PM.  If you have any questions, ask your nurse or doctor.               Start taking these medicines.        Dose/Directions    ciprofloxacin 500 MG tablet   Commonly known as:  CIPRO   Used for:  Acute cystitis with hematuria   Started by:  Jennifer Weiss APRN CNP        Dose:  500 mg   Take 1 tablet (500 mg) by mouth 2 times daily   Quantity:  10 tablet   Refills:  0         Stop taking these medicines if you haven't already. Please contact your care team if you have questions.     sulfamethoxazole-trimethoprim 800-160 MG per tablet   Commonly known as:  BACTRIM DS/SEPTRA DS   Stopped by:  Jennifer Weiss APRN CNP                Where to get your medicines      These medications were sent to [facility], [place] - [address], [address]  [address], [place] [address]     Phone:  [phone]     ciprofloxacin 500 MG tablet                Primary Care Provider Office Phone # Fax #    Tana Garcia PA-C [phone] [address]  [place] [address]        Equal Access to Services     [facility]: Hadii aad ku hadasho Soomaali, waaxda luqadaha, qaybta kaalmada adeegyada, waxay idiin hayaan adeeg kharash la'aan . So [facility] [phone].    ATENCIÓN: Si habla español, tiene a craft disposición servicios gratuitos de asistencia lingüística. Llame al [phone].    We comply with applicable federal civil rights laws and [place] laws. We do not discriminate on the basis of race, color, national origin, age, disability, sex, sexual orientation, or gender identity.            Thank you!     Thank you for choosing [facility]  for your care. Our goal is always to provide you with excellent care. Hearing back from our patients is one way we can continue to improve our services. Please take a few minutes to complete the written survey that you may receive in the mail after your visit with us. Thank you!             Your Updated Medication List - Protect others around you: Learn how to safely use, store and throw away your medicines at www.disposemymeds.org.          This list is accurate as of 5/1/18  3:17 PM.  Always use your most recent med list.                   Brand Name Dispense Instructions for use Diagnosis    aspirin 81 MG tablet     30 tablet    Take 1 tablet (81 mg) by mouth daily        CENTRUM SILVER per tablet      Take 1 tablet by mouth daily        ciprofloxacin 500 MG tablet    CIPRO    10 tablet    Take 1 tablet (500 mg) by mouth 2 times daily    Acute cystitis with hematuria       FISH OIL PO      Take  by mouth.        magnesium 250 MG tablet      Take 1 tablet by mouth daily        VITAMIN D PO      Take  by mouth.

## 2018-05-01 NOTE — NURSING NOTE
"Chief Complaint   Patient presents with     UTI       Initial /60  Pulse 73  Temp 98.8  F (37.1  C) (Oral)  Ht 5' 2.5\" (1.588 m)  Wt 115 lb (52.2 kg)  SpO2 98%  Breastfeeding? No  BMI 20.7 kg/m2 Estimated body mass index is 20.7 kg/(m^2) as calculated from the following:    Height as of this encounter: 5' 2.5\" (1.588 m).    Weight as of this encounter: 115 lb (52.2 kg).  Medication Reconciliation: complete     Americo Michele, IMANI     "

## 2018-05-03 LAB
BACTERIA SPEC CULT: ABNORMAL
SPECIMEN SOURCE: ABNORMAL

## 2018-11-12 ENCOUNTER — OFFICE VISIT (OUTPATIENT)
Dept: FAMILY MEDICINE | Facility: CLINIC | Age: 83
End: 2018-11-12
Payer: COMMERCIAL

## 2018-11-12 ENCOUNTER — HOSPITAL ENCOUNTER (OUTPATIENT)
Dept: BONE DENSITY | Facility: CLINIC | Age: 83
Discharge: HOME | End: 2018-11-12
Attending: PHYSICIAN ASSISTANT | Admitting: PHYSICIAN ASSISTANT
Payer: MEDICARE

## 2018-11-12 VITALS
HEIGHT: 63 IN | DIASTOLIC BLOOD PRESSURE: 59 MMHG | BODY MASS INDEX: 19.84 KG/M2 | TEMPERATURE: 98 F | WEIGHT: 112 LBS | OXYGEN SATURATION: 100 % | SYSTOLIC BLOOD PRESSURE: 136 MMHG | HEART RATE: 60 BPM

## 2018-11-12 DIAGNOSIS — M81.0 OSTEOPOROSIS WITHOUT CURRENT PATHOLOGICAL FRACTURE, UNSPECIFIED OSTEOPOROSIS TYPE: ICD-10-CM

## 2018-11-12 DIAGNOSIS — Z78.0 POSTMENOPAUSAL STATUS: ICD-10-CM

## 2018-11-12 DIAGNOSIS — L65.9 HAIR LOSS: ICD-10-CM

## 2018-11-12 DIAGNOSIS — R41.3 MEMORY LOSS: ICD-10-CM

## 2018-11-12 DIAGNOSIS — Z00.00 ENCOUNTER FOR ROUTINE ADULT HEALTH EXAMINATION WITHOUT ABNORMAL FINDINGS: Primary | ICD-10-CM

## 2018-11-12 LAB
ERYTHROCYTE [DISTWIDTH] IN BLOOD BY AUTOMATED COUNT: 12.4 % (ref 10–15)
HCT VFR BLD AUTO: 38.5 % (ref 35–47)
HGB BLD-MCNC: 12.2 G/DL (ref 11.7–15.7)
MCH RBC QN AUTO: 33.3 PG (ref 26.5–33)
MCHC RBC AUTO-ENTMCNC: 31.7 G/DL (ref 31.5–36.5)
MCV RBC AUTO: 105 FL (ref 78–100)
PLATELET # BLD AUTO: 155 10E9/L (ref 150–450)
RBC # BLD AUTO: 3.66 10E12/L (ref 3.8–5.2)
VIT B12 SERPL-MCNC: 652 PG/ML (ref 193–986)
WBC # BLD AUTO: 5.1 10E9/L (ref 4–11)

## 2018-11-12 PROCEDURE — 82728 ASSAY OF FERRITIN: CPT | Performed by: PHYSICIAN ASSISTANT

## 2018-11-12 PROCEDURE — 77080 DXA BONE DENSITY AXIAL: CPT

## 2018-11-12 PROCEDURE — 36415 COLL VENOUS BLD VENIPUNCTURE: CPT | Performed by: PHYSICIAN ASSISTANT

## 2018-11-12 PROCEDURE — 99397 PER PM REEVAL EST PAT 65+ YR: CPT | Performed by: PHYSICIAN ASSISTANT

## 2018-11-12 PROCEDURE — 82306 VITAMIN D 25 HYDROXY: CPT | Performed by: PHYSICIAN ASSISTANT

## 2018-11-12 PROCEDURE — 80053 COMPREHEN METABOLIC PANEL: CPT | Performed by: PHYSICIAN ASSISTANT

## 2018-11-12 PROCEDURE — 84443 ASSAY THYROID STIM HORMONE: CPT | Performed by: PHYSICIAN ASSISTANT

## 2018-11-12 PROCEDURE — 82607 VITAMIN B-12: CPT | Performed by: PHYSICIAN ASSISTANT

## 2018-11-12 PROCEDURE — 85027 COMPLETE CBC AUTOMATED: CPT | Performed by: PHYSICIAN ASSISTANT

## 2018-11-12 NOTE — PROGRESS NOTES
"    SUBJECTIVE:   Selena Jacobo is a 88 year old female who presents for Preventive Visit.    Pt continue to walk 2 miles per day either on treadmill or outside at [facility]  She is drinking one Ensure per day, typically no lunch but eats her dinner in the dining room  She has leg cramps at nights but is no longer taking her Mg  Her dtr notes her mother's memory is worse     Are you in the first 12 months of your Medicare Part B coverage?  No    Physical Health:    In general, how would you rate your overall physical health? excellent    Outside of work, how many days during the week do you exercise? 6-7 days/week    Outside of work, approximately how many minutes a day do you exercise?45-60 minutes walk 2 miles     If you drink alcohol do you typically have >3 drinks per day or >7 drinks per week? No    Do you usually eat at least 4 servings of fruit and vegetables a day, include whole grains & fiber and avoid regularly eating high fat or \"junk\" foods? NO not 4 a day ,     Do you have any problems taking medications regularly?  YES, Over the counter (no prescribed meds.    Do you have any side effects from medications? not applicable    Needs assistance for the following daily activities: preparing meals and money management    Which of the following safety concerns are present in your home?:  none identified     Hearing impairment: No    In the past 6 months, have you been bothered by leaking of urine? no    Mental Health:    In general, how would you rate your overall mental or emotional health? good  PHQ-2 Score:      Additional concerns to address?  Memory, leg cramps    Fall risk:      Fallen 2 or more times in the past year?: No  Any fall with injury in the past year?: No        COGNITIVE SCREEN  1) Repeat 3 items (Leader, Season, Table)    2) Clock draw: ABNORMAL reversed short and long hands but properly placed  3) 3 item recall: Recalls 1 object   Results: ABNORMAL clock, 1-2 items recalled: " PROBABLE COGNITIVE IMPAIRMENT, **INFORM PROVIDER**    Mini-CogTM Copyright ROSANA Echevarria. Licensed by the author for use in [facility]; reprinted with permission ([email or web address]@[email or web address]). All rights reserved.                Reviewed and updated as needed this visit by clinical staff         Reviewed and updated as needed this visit by Provider        Social History   Substance Use Topics     Smoking status: Never Smoker     Smokeless tobacco: Never Used     Alcohol use 0.0 oz/week     0 Standard drinks or equivalent per week      Comment: rare glass of wine                             Do you feel safe in your environment - Yes    Do you have a Health Care Directive?: No: Advance care planning reviewed with patient; information given to patient to review.    Current providers sharing in care for this patient include:   Patient Care Team:  Tana Garcia PA-C as PCP - General (Internal Medicine)    The following health maintenance items are reviewed in Epic and correct as of today:  Health Maintenance   Topic Date Due     INFLUENZA VACCINE (1) 09/01/2018     FALL RISK ASSESSMENT  11/30/2018     PHQ-2 Q1 YR  11/30/2018     ADVANCE DIRECTIVE PLANNING Q5 YRS  08/30/2022     TETANUS IMMUNIZATION (SYSTEM ASSIGNED)  06/10/2023     PNEUMOCOCCAL  Completed     Past Medical History:   Diagnosis Date     Generalized OA      GERD (gastroesophageal reflux disease)      Hyperlipidemia      Osteoporosis      Rotator cuff tear      UPJ stricture, acquired 1999    L retrograde uterteogram, balloon dilation     Past Surgical History:   Procedure Laterality Date     CATARACT IOL, RT/LT       Current Outpatient Prescriptions   Medication Sig Dispense Refill     Ascorbic Acid (VITAMIN C PO) Take by mouth daily       aspirin 81 MG tablet Take 1 tablet (81 mg) by mouth daily 30 tablet      Multiple Vitamins-Minerals (CENTRUM SILVER) per tablet Take 1 tablet by mouth daily       Cholecalciferol (VITAMIN D PO) Take  by mouth.        "magnesium 250 MG tablet Take 1 tablet by mouth daily       Omega-3 Fatty Acids (FISH OIL PO) Take  by mouth.         ROS:  Constitutional, HEENT, cardiovascular, pulmonary, GI, , musculoskeletal, neuro, skin, endocrine and psych systems are negative, except as otherwise noted.    OBJECTIVE:   There were no vitals taken for this visit. Estimated body mass index is 20.7 kg/(m^2) as calculated from the following:    Height as of 5/1/18: 5' 2.5\" (1.588 m).    Weight as of 5/1/18: 115 lb (52.2 kg).  EXAM:   GENERAL APPEARANCE: healthy, alert and no distress  EYES: Eyes grossly normal to inspection, PERRL and conjunctivae and sclerae normal  HENT: ear canals and TM's normal, nose and mouth without ulcers or lesions, oropharynx clear and oral mucous membranes moist  NECK: no adenopathy, no asymmetry, masses, or scars and thyroid normal to palpation  RESP: lungs clear to auscultation - no rales, rhonchi or wheezes  BREAST: declined  CV: regular rate and rhythm, normal S1 S2, no S3 or S4, no murmur, click or rub, no peripheral edema and peripheral pulses strong  ABDOMEN: soft, nontender, no hepatosplenomegaly, no masses and bowel sounds normal  MS: no musculoskeletal defects are noted and gait is age appropriate without ataxia  SKIN: no suspicious lesions or rashes  NEURO: Normal strength and tone, sensory exam grossly normal, mentation intact and speech normal  PSYCH: mentation appears normal and affect normal/bright        ASSESSMENT / PLAN:   Assessment and Plan:     (Z00.00) Encounter for routine adult health examination without abnormal findings  (primary encounter diagnosis)  Comment: already had her flu shot. Declines breast ca screening. Discussed shingrex  Plan: CBC with platelets, Comprehensive metabolic         panel            (R41.3) Memory loss  Comment:   Plan: Vitamin B12, TSH            (L65.9) Hair loss  Comment:   Plan: Ferritin            (Z78.0) Postmenopausal status  Comment:   Plan: DX " "Hip/Pelvis/Spine            (M81.0) Osteoporosis without current pathological fracture, unspecified osteoporosis type  Comment:   Plan: DX Hip/Pelvis/Spine, Vitamin D Deficiency                End of Life Planning:  Patient currently has an advanced directive: Yes.  Practitioner is supportive of decision.    COUNSELING:  Reviewed preventive health counseling, as reflected in patient instructions    BP Readings from Last 1 Encounters:   05/01/18 122/60     Estimated body mass index is 20.7 kg/(m^2) as calculated from the following:    Height as of 5/1/18: 5' 2.5\" (1.588 m).    Weight as of 5/1/18: 115 lb (52.2 kg).           reports that she has never smoked. She has never used smokeless tobacco.      Appropriate preventive services were discussed with this patient, including applicable screening as appropriate for cardiovascular disease, diabetes, osteopenia/osteoporosis, and glaucoma.  As appropriate for age/gender, discussed screening for colorectal cancer, prostate cancer, breast cancer, and cervical cancer. Checklist reviewing preventive services available has been given to the patient.    Reviewed patients plan of care and provided an AVS. The Basic Care Plan (routine screening as documented in Health Maintenance) for Selena meets the Care Plan requirement. This Care Plan has been established and reviewed with the Patient and daughter.    Counseling Resources:  ATP IV Guidelines  Pooled Cohorts Equation Calculator  Breast Cancer Risk Calculator  FRAX Risk Assessment  ICSI Preventive Guidelines  Dietary Guidelines for Americans, 2010  USDA's MyPlate  ASA Prophylaxis  Lung CA Screening    Tana Garcia PA-C  [facility]  "

## 2018-11-12 NOTE — PATIENT INSTRUCTIONS

## 2018-11-12 NOTE — LETTER
[place] [address] [facility]  [address]  [place]  [address]  Tel: [phone]    November 13, 2018    Selena Jacobo  [address]  [facility] [id number]        Dear Ms. Jacoob,    It was a pleasure seeing you for your physical examination.  I wanted to get back to you with your test results.  I have enclosed a copy for your review.       I am happy to report that your chemistry panel shows no signs of diabetes.  Your blood salts, kidney tests, liver tests, and proteins are all fine.   Your hemoglobin and white blood cell counts are normal.     Your vitamin D level, thyroid level (TSH), vitamin B12 level and iron stores (ferritin) were all normal.     Please let me know if you have any questions.      If you have any further questions or problems, please contact our office.      Sincerely,    Tana Garcia PA-C/marianna        Enclosure: Lab Results                        Results for orders placed or performed in visit on 11/12/18   CBC with platelets   Result Value Ref Range    WBC 5.1 4.0 - 11.0 10e9/L    RBC Count 3.66 (L) 3.8 - 5.2 10e12/L    Hemoglobin 12.2 11.7 - 15.7 g/dL    Hematocrit 38.5 35.0 - 47.0 %     (H) 78 - 100 fl    MCH 33.3 (H) 26.5 - 33.0 pg    MCHC 31.7 31.5 - 36.5 g/dL    RDW 12.4 10.0 - 15.0 %    Platelet Count 155 150 - 450 10e9/L   Comprehensive metabolic panel   Result Value Ref Range    Sodium 141 133 - 144 mmol/L    Potassium 4.0 3.4 - 5.3 mmol/L    Chloride 107 94 - 109 mmol/L    Carbon Dioxide 29 20 - 32 mmol/L    Anion Gap 5 3 - 14 mmol/L    Glucose 85 70 - 99 mg/dL    Urea Nitrogen 25 7 - 30 mg/dL    Creatinine 0.60 0.52 - 1.04 mg/dL    GFR Estimate >90 >60 mL/min/1.7m2    GFR Estimate If Black >90 >60 mL/min/1.7m2    Calcium 9.3 8.5 - 10.1 mg/dL    Bilirubin Total 0.2 0.2 - 1.3 mg/dL    Albumin 3.6 3.4 - 5.0 g/dL    Protein Total 7.4 6.8 - 8.8 g/dL    Alkaline Phosphatase 89 40 - 150 U/L    ALT 20 0 - 50 U/L    AST 26 0 - 45 U/L   Vitamin B12   Result Value  Ref Range    Vitamin B12 652 193 - 986 pg/mL   TSH   Result Value Ref Range    TSH 3.96 0.40 - 4.00 mU/L   Ferritin   Result Value Ref Range    Ferritin 68 8 - 252 ng/mL   Vitamin D Deficiency   Result Value Ref Range    Vitamin D Deficiency screening 41 20 - 75 ug/L

## 2018-11-12 NOTE — MR AVS SNAPSHOT
After Visit Summary   11/12/2018    Selena Jacobo    MRN: [id number]           Patient Information     Date Of Birth          1/11/1930        Visit Information        Provider Department      11/12/2018 10:30 AM Tana Garcia PA-C [facility]        Today's Diagnoses     Encounter for routine adult health examination without abnormal findings    -  1    Memory loss        Hair loss        Postmenopausal status        Osteoporosis without current pathological fracture, unspecified osteoporosis type          Care Instructions      Preventive Health Recommendations    See your health care provider every year to    Review health changes.     Discuss preventive care.      Review your medicines if your doctor has prescribed any.      You no longer need a yearly Pap test unless you've had an abnormal Pap test in the past 10 years. If you have vaginal symptoms, such as bleeding or discharge, be sure to talk with your provider about a Pap test.      Every 1 to 2 years, have a mammogram.  If you are over 69, talk with your health care provider about whether or not you want to continue having screening mammograms.      Every 10 years, have a colonoscopy. Or, have a yearly FIT test (stool test). These exams will check for colon cancer.       Have a cholesterol test every 5 years, or more often if your doctor advises it.       Have a diabetes test (fasting glucose) every three years. If you are at risk for diabetes, you should have this test more often.       At age 65, have a bone density scan (DEXA) to check for osteoporosis (brittle bone disease).    Shots:    Get a flu shot each year.    Get a tetanus shot every 10 years.    Talk to your doctor about your pneumonia vaccines. There are now two you should receive - Pneumovax (PPSV 23) and Prevnar (PCV 13).    Talk to your pharmacist about the shingles vaccine.    Talk to your doctor about the hepatitis B vaccine.    Nutrition:     Eat at least 5  servings of fruits and vegetables each day.      Eat whole-grain bread, whole-wheat pasta and brown rice instead of white grains and rice.      Get adequate Calcium and Vitamin D.     Lifestyle    Exercise at least 150 minutes a week (30 minutes a day, 5 days a week). This will help you control your weight and prevent disease.      Limit alcohol to one drink per day.      No smoking.       Wear sunscreen to prevent skin cancer.       See your dentist twice a year for an exam and cleaning.      See your eye doctor every 1 to 2 years to screen for conditions such as glaucoma, macular degeneration and cataracts.    Personalized Prevention Plan  You are due for the preventive services outlined below.  Your care team is available to assist you in scheduling these services.  If you have already completed any of these items, please share that information with your care team to update in your medical record.  Health Maintenance Due   Topic Date Due     Flu Vaccine (1) 09/01/2018     FALL RISK ASSESSMENT  11/30/2018     Depression Assessment 2 - yearly  11/30/2018             Follow-ups after your visit        Future tests that were ordered for you today     Open Future Orders        Priority Expected Expires Ordered    DX Hip/Pelvis/Spine Routine  11/12/2019 11/12/2018            Who to contact     If you have questions or need follow up information about today's clinic visit or your schedule please contact [facility] directly at [phone].  Normal or non-critical lab and imaging results will be communicated to you by MyChart, letter or phone within 4 business days after the clinic has received the results. If you do not hear from us within 7 days, please contact the clinic through MyChart or phone. If you have a critical or abnormal lab result, we will notify you by phone as soon as possible.  Submit refill requests through [employer] or call your pharmacy and they will forward the refill request to us. Please  "allow 3 business days for your refill to be completed.          Additional Information About Your Visit        MyChart Information     [employer] lets you send messages to your doctor, view your test results, renew your prescriptions, schedule appointments and more. To sign up, go to [email or web address] . Click on \"Log in\" on the left side of the screen, which will take you to the Welcome page. Then click on \"Sign up Now\" on the right side of the page.     You will be asked to enter the access code listed below, as well as some personal information. Please follow the directions to create your username and password.     Your access code is: [email or web address]  Expires: 2/10/2019 11:07 AM     Your access code will  in 90 days. If you need help or a new code, please call your [facility] clinic or [phone].        Care EveryWhere ID     This is your Care EveryWhere ID. This could be used by other organizations to access your [facility] medical records  [id number]        Your Vitals Were     Pulse Temperature Height Pulse Oximetry Breastfeeding? BMI (Body Mass Index)    60 98  F (36.7  C) (Oral) 5' 2.5\" (1.588 m) 100% No 20.16 kg/m2       Blood Pressure from Last 3 Encounters:   18 136/59   18 122/60   17 117/60    Weight from Last 3 Encounters:   18 112 lb (50.8 kg)   18 115 lb (52.2 kg)   17 106 lb 3.2 oz (48.2 kg)              We Performed the Following     CBC with platelets     Comprehensive metabolic panel     Ferritin     TSH     Vitamin B12        Primary Care Provider Office Phone # Fax #    Tana Garcia PA-C [phone] [address]  [place] [address]        Equal Access to Services     REJI LONG : Ivon Winston, waaxda luqadaha, qaybta kaalmada joe, vicki cancion. [facility] [phone].    ATENCIÓN: Si habla español, tiene a craft disposición servicios gratuitos de asistencia lingüística. " Mike vilchis [phone].    We comply with applicable federal civil rights laws and [place] laws. We do not discriminate on the basis of race, color, national origin, age, disability, sex, sexual orientation, or gender identity.            Thank you!     Thank you for choosing [facility]  for your care. Our goal is always to provide you with excellent care. Hearing back from our patients is one way we can continue to improve our services. Please take a few minutes to complete the written survey that you may receive in the mail after your visit with us. Thank you!             Your Updated Medication List - Protect others around you: Learn how to safely use, store and throw away your medicines at www.disposemymeds.org.          This list is accurate as of 11/12/18 11:07 AM.  Always use your most recent med list.                   Brand Name Dispense Instructions for use Diagnosis    aspirin 81 MG tablet     30 tablet    Take 1 tablet (81 mg) by mouth daily        CENTRUM SILVER per tablet      Take 1 tablet by mouth daily        FISH OIL PO      Take  by mouth.        magnesium 250 MG tablet      Take 1 tablet by mouth daily        VITAMIN C PO      Take by mouth daily        VITAMIN D PO      Take  by mouth.

## 2018-11-13 LAB
ALBUMIN SERPL-MCNC: 3.6 G/DL (ref 3.4–5)
ALP SERPL-CCNC: 89 U/L (ref 40–150)
ALT SERPL W P-5'-P-CCNC: 20 U/L (ref 0–50)
ANION GAP SERPL CALCULATED.3IONS-SCNC: 5 MMOL/L (ref 3–14)
AST SERPL W P-5'-P-CCNC: 26 U/L (ref 0–45)
BILIRUB SERPL-MCNC: 0.2 MG/DL (ref 0.2–1.3)
BUN SERPL-MCNC: 25 MG/DL (ref 7–30)
CALCIUM SERPL-MCNC: 9.3 MG/DL (ref 8.5–10.1)
CHLORIDE SERPL-SCNC: 107 MMOL/L (ref 94–109)
CO2 SERPL-SCNC: 29 MMOL/L (ref 20–32)
CREAT SERPL-MCNC: 0.6 MG/DL (ref 0.52–1.04)
DEPRECATED CALCIDIOL+CALCIFEROL SERPL-MC: 41 UG/L (ref 20–75)
FERRITIN SERPL-MCNC: 68 NG/ML (ref 8–252)
GFR SERPL CREATININE-BSD FRML MDRD: >90 ML/MIN/1.7M2
GLUCOSE SERPL-MCNC: 85 MG/DL (ref 70–99)
POTASSIUM SERPL-SCNC: 4 MMOL/L (ref 3.4–5.3)
PROT SERPL-MCNC: 7.4 G/DL (ref 6.8–8.8)
SODIUM SERPL-SCNC: 141 MMOL/L (ref 133–144)
TSH SERPL DL<=0.005 MIU/L-ACNC: 3.96 MU/L (ref 0.4–4)

## 2018-11-13 NOTE — PROGRESS NOTES
It was a pleasure seeing you for your physical examination.  I wanted to get back to you with your test results.  I have enclosed a copy for your review.      I am happy to report that your chemistry panel shows no signs of diabetes.  Your blood salts, kidney tests, liver tests, and proteins are all fine.  Your hemoglobin and white blood cell counts are normal.    Your vitamin D level, thyroid level (TSH), vitamin B12 level and iron stores (ferritin) were all normal.    Please let me know if you have any questions.    Tana Garcia PA-C

## 2018-11-16 DIAGNOSIS — M81.0 AGE-RELATED OSTEOPOROSIS WITHOUT CURRENT PATHOLOGICAL FRACTURE: Primary | ICD-10-CM

## 2018-11-16 NOTE — PROGRESS NOTES
Discussed results with pt  She has tried and fail fosamax in the past; doesn't recall what side effect she had  Discussed calcium  Her vit D level normal  Recd prolia  MTM ref placed

## 2018-11-19 ENCOUNTER — TELEPHONE (OUTPATIENT)
Dept: PHARMACY | Facility: OTHER | Age: 83
End: 2018-11-19

## 2019-03-04 ENCOUNTER — TELEPHONE (OUTPATIENT)
Dept: FAMILY MEDICINE | Facility: CLINIC | Age: 84
End: 2019-03-04

## 2019-03-04 DIAGNOSIS — M81.0 AGE-RELATED OSTEOPOROSIS WITHOUT CURRENT PATHOLOGICAL FRACTURE: Primary | ICD-10-CM

## 2019-03-04 NOTE — TELEPHONE ENCOUNTER
Pt and Daughter walked into clinic today, as they were here getting shingle shot booster.  They stated they need to get prolia, but no orders in system.  This was mentioned in November with referral to MTM.    Daughter is here this week, and then back for a week in June.      1. Do they need to see MTM for Prolia first?   2. If not, pended medication and smartset for review.  Do you want labs first?    3.  OK for pt to get Prolia later this week (Thurs/Friday) if she just had shingles booster today?    Kathy Dawn RN

## 2019-03-04 NOTE — PATIENT INSTRUCTIONS
PROLIA  Risk Evaluation and Mitigation Strategy Best Practice Advisory    In May 2015, the FDA required an update to the PROLIA  (denosumab) REMS (Risk Evaluation and Mitigation Strategy) to inform both providers and patients about potential serious risks related to PROLIA .    These potential serious risks include:    Hypocalcemia    Osteonecrosis of the jaw    Atypical femoral fractures    Serious Infections    Dermatologic reactions    To ensure compliance with these requirements [employer] has developed a workflow for those patients who will receive PROLIA  at clinic.  This workflow includes insurance verification, patient scheduling, patient education, and documentation. Registered Nurses in the clinic should have completed eLearning related to the REMS and the clinic workflow.  Refer to them to initiate this process.  This workflow does not need to be executed if the patient is to receive PROLIA  injection at [facility].       The  has put together a Patient Education Toolkit.  Copies of these handouts may be available your clinic. Patients must receive the Patient Brochure and Medication Guide when receiving injection at clinic.  These will be attached to the injection ordered from [employer] to the clinic. Links to handouts:  Patient Counseling Chart for Healthcare Providers  Patient Brochure  Prescribing Information  Medication Guide    If you are having trouble accessing the above links, these documents can be found at: http://[email or web address]    The role of healthcare provider includes reviewing patient education materials with the patient, advising patients to seek medical attention if they have signs or symptoms of one of the serious risks, and provide patients a copy of the Patient Brochure and Medication Guide when receiving their injection.      Due to the risk of hypocalcemia the Prescribing  Information for PROLIA  recommends that calcium and mineral levels (magnesium and phosphorus) be checked within 14 days of injection for those predisposed to hypocalcemia.

## 2019-03-05 DIAGNOSIS — M81.0 AGE-RELATED OSTEOPOROSIS WITHOUT CURRENT PATHOLOGICAL FRACTURE: ICD-10-CM

## 2019-03-05 PROCEDURE — 83735 ASSAY OF MAGNESIUM: CPT | Performed by: PHYSICIAN ASSISTANT

## 2019-03-05 PROCEDURE — 84100 ASSAY OF PHOSPHORUS: CPT | Performed by: PHYSICIAN ASSISTANT

## 2019-03-05 PROCEDURE — 82310 ASSAY OF CALCIUM: CPT | Performed by: PHYSICIAN ASSISTANT

## 2019-03-05 PROCEDURE — 36415 COLL VENOUS BLD VENIPUNCTURE: CPT | Performed by: PHYSICIAN ASSISTANT

## 2019-03-05 NOTE — TELEPHONE ENCOUNTER
Bella Signed, David can you run asap, hopefully can bring in Friday for Prolia.      Will call daughter to have labs run.  Kathy Dawn RN

## 2019-03-05 NOTE — LETTER
[place] [address] [facility]  [address]  [place]  [address]  Tel: [phone]    March 6, 2019    Selena Jacobo  [address]  [facility] [address]        Dear Ms. Jacobo,    Your calcium and phosphorus levels were normal.  Your magnesium levels were slightly elevated.   Please stop your magnesium supplement and let me know if that causes you any problems.      Sincerely,    Tana Garcia PA-C/ELIZABETH          Enclosure: Lab Results  Results for orders placed or performed in visit on 03/05/19   Phosphorus   Result Value Ref Range    Phosphorus 3.4 2.5 - 4.5 mg/dL   Magnesium   Result Value Ref Range    Magnesium 2.5 (H) 1.6 - 2.3 mg/dL   Calcium   Result Value Ref Range    Calcium 9.4 8.5 - 10.1 mg/dL

## 2019-03-06 ENCOUNTER — TELEPHONE (OUTPATIENT)
Dept: FAMILY MEDICINE | Facility: CLINIC | Age: 84
End: 2019-03-06

## 2019-03-06 ENCOUNTER — OFFICE VISIT (OUTPATIENT)
Dept: FAMILY MEDICINE | Facility: CLINIC | Age: 84
End: 2019-03-06
Payer: COMMERCIAL

## 2019-03-06 VITALS
HEIGHT: 63 IN | TEMPERATURE: 97.6 F | WEIGHT: 115 LBS | BODY MASS INDEX: 20.38 KG/M2 | OXYGEN SATURATION: 100 % | HEART RATE: 51 BPM | DIASTOLIC BLOOD PRESSURE: 62 MMHG | SYSTOLIC BLOOD PRESSURE: 144 MMHG

## 2019-03-06 DIAGNOSIS — R30.0 DYSURIA: Primary | ICD-10-CM

## 2019-03-06 DIAGNOSIS — N39.0 URINARY TRACT INFECTION WITHOUT HEMATURIA, SITE UNSPECIFIED: ICD-10-CM

## 2019-03-06 LAB
ALBUMIN UR-MCNC: NEGATIVE MG/DL
APPEARANCE UR: ABNORMAL
BACTERIA #/AREA URNS HPF: ABNORMAL /HPF
BILIRUB UR QL STRIP: NEGATIVE
CALCIUM SERPL-MCNC: 9.4 MG/DL (ref 8.5–10.1)
COLOR UR AUTO: YELLOW
GLUCOSE UR STRIP-MCNC: NEGATIVE MG/DL
HGB UR QL STRIP: ABNORMAL
KETONES UR STRIP-MCNC: NEGATIVE MG/DL
LEUKOCYTE ESTERASE UR QL STRIP: ABNORMAL
MAGNESIUM SERPL-MCNC: 2.5 MG/DL (ref 1.6–2.3)
NITRATE UR QL: POSITIVE
NON-SQ EPI CELLS #/AREA URNS LPF: ABNORMAL /LPF
PH UR STRIP: 5.5 PH (ref 5–7)
PHOSPHATE SERPL-MCNC: 3.4 MG/DL (ref 2.5–4.5)
RBC #/AREA URNS AUTO: ABNORMAL /HPF
SOURCE: ABNORMAL
SP GR UR STRIP: 1.02 (ref 1–1.03)
UROBILINOGEN UR STRIP-ACNC: 0.2 EU/DL (ref 0.2–1)
WBC #/AREA URNS AUTO: >100 /HPF

## 2019-03-06 PROCEDURE — 81001 URINALYSIS AUTO W/SCOPE: CPT | Performed by: PHYSICIAN ASSISTANT

## 2019-03-06 PROCEDURE — 87088 URINE BACTERIA CULTURE: CPT | Performed by: PHYSICIAN ASSISTANT

## 2019-03-06 PROCEDURE — 87186 SC STD MICRODIL/AGAR DIL: CPT | Performed by: PHYSICIAN ASSISTANT

## 2019-03-06 PROCEDURE — 87086 URINE CULTURE/COLONY COUNT: CPT | Performed by: PHYSICIAN ASSISTANT

## 2019-03-06 PROCEDURE — 99213 OFFICE O/P EST LOW 20 MIN: CPT | Performed by: PHYSICIAN ASSISTANT

## 2019-03-06 RX ORDER — NITROFURANTOIN 25; 75 MG/1; MG/1
100 CAPSULE ORAL 2 TIMES DAILY
Qty: 14 CAPSULE | Refills: 0 | Status: SHIPPED | OUTPATIENT
Start: 2019-03-06 | End: 2019-04-09

## 2019-03-06 ASSESSMENT — MIFFLIN-ST. JEOR: SCORE: 907.83

## 2019-03-06 NOTE — PROGRESS NOTES
"  SUBJECTIVE:   Selena Jacobo is a 89 year old female who presents to clinic today for the following health issues:    UTI - Female  Duration of complaint: 1 month   Feels burning on urination  Denies gross hematuria, fever or flank pain  Hx of UTI last May 2018    Past Medical History:   Diagnosis Date     Generalized OA      GERD (gastroesophageal reflux disease)      Hyperlipidemia      Osteoporosis      Rotator cuff tear      UPJ stricture, acquired 1999    L retrograde uterteogram, balloon dilation     Past Surgical History:   Procedure Laterality Date     CATARACT IOL, RT/LT       Social History     Tobacco Use     Smoking status: Never Smoker     Smokeless tobacco: Never Used   Substance Use Topics     Alcohol use: Yes     Alcohol/week: 0.0 oz     Comment: rare glass of wine  maybe a holiday / special occassion      Current Outpatient Medications   Medication Sig Dispense Refill     Ascorbic Acid (VITAMIN C PO) Take by mouth daily       aspirin 81 MG tablet Take 1 tablet (81 mg) by mouth daily 30 tablet      Cholecalciferol (VITAMIN D PO) Take  by mouth.       Multiple Vitamins-Minerals (CENTRUM SILVER) per tablet Take 1 tablet by mouth daily       nitroFURantoin macrocrystal-monohydrate (MACROBID) 100 MG capsule Take 1 capsule (100 mg) by mouth 2 times daily 14 capsule 0     Omega-3 Fatty Acids (FISH OIL PO) Take  by mouth.       denosumab (PROLIA) 60 MG/ML SOLN injection Inject 1 mL (60 mg) Subcutaneous once for 1 dose 1 mL 0     Allergies   Allergen Reactions     Fosamax [Alendronate Sodium]      Penicillins Other (See Comments)     fever     PHYSICAL EXAM:    /62 (BP Location: Right arm, Cuff Size: Adult Small)   Pulse 51   Temp 97.6  F (36.4  C) (Tympanic)   Ht 1.588 m (5' 2.5\")   Wt 52.2 kg (115 lb)   SpO2 100%   Breastfeeding? No   BMI 20.70 kg/m      Patient appears non toxic    Results for orders placed or performed in visit on 03/06/19   UA reflex to Microscopic and Culture   Result " Value Ref Range    Color Urine Yellow     Appearance Urine Cloudy     Glucose Urine Negative NEG^Negative mg/dL    Bilirubin Urine Negative NEG^Negative    Ketones Urine Negative NEG^Negative mg/dL    Specific Gravity Urine 1.025 1.003 - 1.035    Blood Urine Moderate (A) NEG^Negative    pH Urine 5.5 5.0 - 7.0 pH    Protein Albumin Urine Negative NEG^Negative mg/dL    Urobilinogen Urine 0.2 0.2 - 1.0 EU/dL    Nitrite Urine Positive (A) NEG^Negative    Leukocyte Esterase Urine Large (A) NEG^Negative    Source Midstream Urine    Urine Microscopic   Result Value Ref Range    WBC Urine >100 (A) OTO5^0 - 5 /HPF    RBC Urine 25-50 (A) OTO2^O - 2 /HPF    Squamous Epithelial /LPF Urine Few FEW^Few /LPF    Bacteria Urine Few (A) NEG^Negative /HPF     Assessment and Plan:     (R30.0) Dysuria  (primary encounter diagnosis)  Comment:   Plan: UA reflex to Microscopic and Culture, Urine         Microscopic, Urine Culture Aerobic Bacterial,         nitroFURantoin macrocrystal-monohydrate         (MACROBID) 100 MG capsule        Bid x 7 days    (N39.0) Urinary tract infection without hematuria, site unspecified  Comment:   Plan: abx as above, push fluids. F/u if sxs persist.      Tana Garcia PA-C

## 2019-03-06 NOTE — TELEPHONE ENCOUNTER
TO PCP:     Spoke with Dimple, pt's daughter     Last OV 11/12/18 for physical     Burning with urinating x2 days and also experiencing frequency     No bleeding concerns  No back pain  No fever   Cloudiness/malodor? Unsure     She thought drinking lots of water would help but it didn't   Daughter can be reached at #[phone]    Advised OV today. They have a 3pm eye doctor appointment but are otherwise free    There are NO openings with any of our providers here today. No blue spots on PCP schedule.     Please advise - are you able to fit patient in today? Or should we recommend UC appointment instead or ask her to call and see if [facility] has openings?    Ivonne VILLATORO RN

## 2019-03-06 NOTE — TELEPHONE ENCOUNTER
Placed call to [facility] in regards to patient's prolia injection. Patient does have active coverage for injection and would be responsible for a 20% co-insurance for injection. Prior authorization is not needed for patient. Patient is good to come in on Friday 03/08/19 for Prolia injection.    David Thacker CMA on 3/6/2019 at 4:25 PM

## 2019-03-06 NOTE — TELEPHONE ENCOUNTER
Tana,  Scheduled this pt for Prolia on Friday now.  Please advise if any concerns with current UTI.  Will CC Lala for her MTM opinion.  Kathy Dawn RN

## 2019-03-06 NOTE — LETTER
[place] [address] [facility]  [address]  [place]  [address]  Tel: [phone]    March 8, 2019    Selena Jacobo  [address]  [facility] [address]        Selena,     Your urine culture did grow out E coli and shows that the antibiotic should take care of this.     Tana Garcia PA-C     If you have any further questions or problems, please contact our office.      Results for orders placed or performed in visit on 03/06/19   UA reflex to Microscopic and Culture   Result Value Ref Range    Color Urine Yellow     Appearance Urine Cloudy     Glucose Urine Negative NEG^Negative mg/dL    Bilirubin Urine Negative NEG^Negative    Ketones Urine Negative NEG^Negative mg/dL    Specific Gravity Urine 1.025 1.003 - 1.035    Blood Urine Moderate (A) NEG^Negative    pH Urine 5.5 5.0 - 7.0 pH    Protein Albumin Urine Negative NEG^Negative mg/dL    Urobilinogen Urine 0.2 0.2 - 1.0 EU/dL    Nitrite Urine Positive (A) NEG^Negative    Leukocyte Esterase Urine Large (A) NEG^Negative    Source Midstream Urine    Urine Microscopic   Result Value Ref Range    WBC Urine >100 (A) OTO5^0 - 5 /HPF    RBC Urine 25-50 (A) OTO2^O - 2 /HPF    Squamous Epithelial /LPF Urine Few FEW^Few /LPF    Bacteria Urine Few (A) NEG^Negative /HPF   Urine Culture Aerobic Bacterial   Result Value Ref Range    Specimen Description Midstream Urine     Culture Micro >100,000 colonies/mL  Escherichia coli   (A)        Susceptibility    Escherichia coli - CLOVIS     AMPICILLIN 8 Sensitive ug/mL     CEFAZOLIN* <=4 Sensitive ug/mL      * Cefazolin CLOVIS breakpoints are for the treatment of uncomplicated urinary tract infections.  For the treatment of systemic infections, please contact the laboratory for additional testing.     CEFOXITIN <=4 Sensitive ug/mL     CEFTAZIDIME <=1 Sensitive ug/mL     CEFTRIAXONE <=1 Sensitive ug/mL     CIPROFLOXACIN <=0.25 Sensitive ug/mL     GENTAMICIN <=1 Sensitive ug/mL     LEVOFLOXACIN <=0.12 Sensitive ug/mL      NITROFURANTOIN <=16 Sensitive ug/mL     TOBRAMYCIN <=1 Sensitive ug/mL     Trimethoprim/Sulfa <=1/19 Sensitive ug/mL     AMPICILLIN/SULBACTAM 4 Sensitive ug/mL     Piperacillin/Tazo <=4 Sensitive ug/mL     CEFEPIME <=1 Sensitive ug/mL               Enclosure: Lab Results

## 2019-03-06 NOTE — RESULT ENCOUNTER NOTE
Selena,    Your calcium and phosphorus levels were normal.  Your magnesium levels were slightly elevated.   Please stop your magnesium supplement and let me know if that causes you any problems.    Tana Garcia PA-C

## 2019-03-06 NOTE — TELEPHONE ENCOUNTER
Reason for call:  Patient reporting a symptom    Symptom or request: UTI/burning    Duration (how long have symptoms been present): couple days agao    Have you been treated for this before? Yes    Additional comments: Daughter calling would like pt to get CIPROFLOXACIN 500mg tablets prescribed again    Please call Daughter ASAP for direction on what they should do      Phone Number patient can be reached at:  Other phone number:  [phone]    Best Time:  anytime    Can we leave a detailed message on this number:  YES    Call taken on 3/6/2019 at 8:05 AM by Chanelle Laughlin

## 2019-03-08 ENCOUNTER — ALLIED HEALTH/NURSE VISIT (OUTPATIENT)
Dept: NURSING | Facility: CLINIC | Age: 84
End: 2019-03-08
Payer: COMMERCIAL

## 2019-03-08 DIAGNOSIS — M81.0 AGE-RELATED OSTEOPOROSIS WITHOUT CURRENT PATHOLOGICAL FRACTURE: Primary | ICD-10-CM

## 2019-03-08 LAB
BACTERIA SPEC CULT: ABNORMAL
SPECIMEN SOURCE: ABNORMAL

## 2019-03-08 PROCEDURE — 96372 THER/PROPH/DIAG INJ SC/IM: CPT | Performed by: INTERNAL MEDICINE

## 2019-03-08 PROCEDURE — 99207 ZZC NO CHARGE NURSE ONLY: CPT | Performed by: INTERNAL MEDICINE

## 2019-03-08 NOTE — RESULT ENCOUNTER NOTE
Selena,    Your urine culture did grow out E coli and shows that the antibiotic should take care of this.    Tana Garcia PA-C

## 2019-03-08 NOTE — PROGRESS NOTES
Prior to injection, verified patient identity using patient's name and date of birth.  Due to injection administration, patient instructed to remain in clinic for 15 minutes  afterwards, and to report any adverse reaction to me immediately.    Indication: Prolia  (denosumab) is a prescription medicine used to treat osteoporosis in patients who:   Are at high risk for fracture, meaning patients who have had a fracture related to osteoporosis, or who have multiple risk factors for fracture   Cannot use another osteoporosis medicine or other osteoporosis medicines did not work well   The timeline for early/late injections would be 4 weeks early and any time after the 6 month steve. If a patient receives their injection late, then the subsequent injection would be 6 months from the date that they actually received the injection    1.  When was the last injection?  never  2.  Did they check with their insurance for this calendar year?  yes  3.  Is there an order in the chart?  yes  4.  Has the patient had dental work involving the bone in the past month or will have work in the next 6 months?  no    The following steps were completed to comply with the REMS program for Prolia:  Reviewed information in the Medication Guide and Patient Counseling Chart, including the serious risks of Prolia  and the symptoms of each risk.  Advised patient to seek prompt medical attention if they have signs or symptoms of any of the serious risks.  Provided each patient a copy of the Medication Guide and Patient Brochure.            Drug Amount Wasted:  None.  Vial/Syringe: Single dose vial  Expiration Date:  05/21

## 2019-04-09 ENCOUNTER — TELEPHONE (OUTPATIENT)
Dept: FAMILY MEDICINE | Facility: CLINIC | Age: 84
End: 2019-04-09

## 2019-04-09 DIAGNOSIS — R30.0 DYSURIA: ICD-10-CM

## 2019-04-09 NOTE — TELEPHONE ENCOUNTER
nitroFURantoin macrocrystal-monohydrate (MACROBID) 100 MG capsule 14 capsule 0 3/6/2019           Last Written Prescription Date:  03/06/2019  Last Fill Quantity: 14,   # refills: 0  Last Office Visit: 03/06/2019  Future Office visit:  Unknown     Routing refill request to provider for review/approval because:  Drug not on the G, P or [facility] refill protocol or controlled substance

## 2019-04-10 RX ORDER — NITROFURANTOIN 25; 75 MG/1; MG/1
CAPSULE ORAL
Qty: 14 CAPSULE | Refills: 0 | Status: SHIPPED | OUTPATIENT
Start: 2019-04-10 | End: 2019-06-12

## 2019-04-10 NOTE — TELEPHONE ENCOUNTER
"Patient she felt significant improvement with use of Nitrofurantoin on 3/6/19.  Now, for the past 4 days, feels \"exactly like I did last month\".    Burning with urination; frequency. \"up to the bathroom 4 times last night\".  Denies fever, abdominal or back pain. Drinking a lot of fluids.    Unable to make it to clinic.  Asks for refill of Nitrofurantoin.  Needs it delivered.  I called pharmacy re: delivery charges and they charge \"whatever the [employer] delivery service charges\".    WILL NEED TO RELAY THIS delivery fee INFO TO PATIENT WHEN WE CALL HER W/ Tana Garcia's recommendation.    Okay to refill?  Thank you,  Pilar Cramer RN on 4/10/2019 at 5:00 PM      "

## 2019-04-11 NOTE — TELEPHONE ENCOUNTER
Contacted patient.  She agrees to the [employer] delivery service for her medication delivery.  Left message for [facility] pharmacy, reminding them to deliver the medication.  This was also indicated on the RX.    Pilar Cramer RN on 4/11/2019 at 7:39 AM

## 2019-06-06 ENCOUNTER — TRANSFERRED RECORDS (OUTPATIENT)
Dept: HEALTH INFORMATION MANAGEMENT | Facility: CLINIC | Age: 84
End: 2019-06-06

## 2019-06-12 ENCOUNTER — OFFICE VISIT (OUTPATIENT)
Dept: FAMILY MEDICINE | Facility: CLINIC | Age: 84
End: 2019-06-12
Payer: COMMERCIAL

## 2019-06-12 VITALS
SYSTOLIC BLOOD PRESSURE: 135 MMHG | HEART RATE: 61 BPM | OXYGEN SATURATION: 97 % | TEMPERATURE: 97.8 F | WEIGHT: 113 LBS | BODY MASS INDEX: 20.02 KG/M2 | HEIGHT: 63 IN | DIASTOLIC BLOOD PRESSURE: 57 MMHG

## 2019-06-12 DIAGNOSIS — N39.0 RECURRENT UTI (URINARY TRACT INFECTION): Primary | ICD-10-CM

## 2019-06-12 DIAGNOSIS — R30.0 DYSURIA: ICD-10-CM

## 2019-06-12 DIAGNOSIS — R82.90 NONSPECIFIC FINDING ON EXAMINATION OF URINE: ICD-10-CM

## 2019-06-12 DIAGNOSIS — Z87.440 PERSONAL HISTORY OF URINARY TRACT INFECTION: ICD-10-CM

## 2019-06-12 DIAGNOSIS — L84 CALLUS OF FOOT: ICD-10-CM

## 2019-06-12 LAB
ALBUMIN UR-MCNC: 100 MG/DL
APPEARANCE UR: CLEAR
BACTERIA #/AREA URNS HPF: ABNORMAL /HPF
BILIRUB UR QL STRIP: NEGATIVE
COLOR UR AUTO: YELLOW
GLUCOSE UR STRIP-MCNC: NEGATIVE MG/DL
HGB UR QL STRIP: ABNORMAL
KETONES UR STRIP-MCNC: NEGATIVE MG/DL
LEUKOCYTE ESTERASE UR QL STRIP: ABNORMAL
NITRATE UR QL: POSITIVE
PH UR STRIP: 6 PH (ref 5–7)
RBC #/AREA URNS AUTO: ABNORMAL /HPF
SOURCE: ABNORMAL
SP GR UR STRIP: 1.02 (ref 1–1.03)
UROBILINOGEN UR STRIP-ACNC: 0.2 EU/DL (ref 0.2–1)
WBC #/AREA URNS AUTO: >100 /HPF

## 2019-06-12 PROCEDURE — 87088 URINE BACTERIA CULTURE: CPT | Performed by: PHYSICIAN ASSISTANT

## 2019-06-12 PROCEDURE — 99214 OFFICE O/P EST MOD 30 MIN: CPT | Performed by: PHYSICIAN ASSISTANT

## 2019-06-12 PROCEDURE — 87086 URINE CULTURE/COLONY COUNT: CPT | Performed by: PHYSICIAN ASSISTANT

## 2019-06-12 PROCEDURE — 81001 URINALYSIS AUTO W/SCOPE: CPT | Performed by: PHYSICIAN ASSISTANT

## 2019-06-12 PROCEDURE — 87186 SC STD MICRODIL/AGAR DIL: CPT | Performed by: PHYSICIAN ASSISTANT

## 2019-06-12 RX ORDER — NITROFURANTOIN 25; 75 MG/1; MG/1
CAPSULE ORAL
Qty: 14 CAPSULE | Refills: 0 | Status: SHIPPED | OUTPATIENT
Start: 2019-06-12 | End: 2020-02-11

## 2019-06-12 ASSESSMENT — MIFFLIN-ST. JEOR: SCORE: 898.75

## 2019-06-12 NOTE — PROGRESS NOTES
HPI: Selena is a pleasant 88 yo female here for possible recurrent UTI  States that for the past week pt has noticed dyruria and urinary freq for the past week  Dtr notes her urine as an odor as well  Denies any gross hematuria  She had a UTI in March of this year and urine cultures grew out E coli. She called back in April with recurrent symptoms and was again treated with antibiotics and sxs resolved.    2. Also wants feet checked for calluses.    Past Medical History:   Diagnosis Date     Generalized OA      GERD (gastroesophageal reflux disease)      Hyperlipidemia      Osteoporosis      Rotator cuff tear      UPJ stricture, acquired 1999    L retrograde uterteogram, balloon dilation     Past Surgical History:   Procedure Laterality Date     CATARACT IOL, RT/LT       Social History     Tobacco Use     Smoking status: Never Smoker     Smokeless tobacco: Never Used   Substance Use Topics     Alcohol use: Yes     Alcohol/week: 0.0 oz     Comment: rare glass of wine  maybe a holiday / special occassion      Current Outpatient Medications   Medication Sig Dispense Refill     Ascorbic Acid (VITAMIN C PO) Take by mouth daily       aspirin 81 MG tablet Take 1 tablet (81 mg) by mouth daily 30 tablet      Cholecalciferol (VITAMIN D PO) Take  by mouth.       Multiple Vitamins-Minerals (CENTRUM SILVER) per tablet Take 1 tablet by mouth daily       nitroFURantoin macrocrystal-monohydrate (MACROBID) 100 MG capsule TAKE ONE CAPSULE BY MOUTH TWICE A DAY 14 capsule 0     Omega-3 Fatty Acids (FISH OIL PO) Take  by mouth.       denosumab (PROLIA) 60 MG/ML SOLN injection Inject 1 mL (60 mg) Subcutaneous once for 1 dose 1 mL 0     Allergies   Allergen Reactions     Fosamax [Alendronate Sodium]      Penicillins Other (See Comments)     fever     FAMILY HISTORY NOTED AND REVIEWED    REVIEW OF SYSTEMS: denies vaginal discharge, itching or odor. Denies flank pain or fever    PHYSICAL EXAM:    /57 (BP Location: Right arm, Cuff Size:  "Adult Regular)   Pulse 61   Temp 97.8  F (36.6  C) (Oral)   Ht 1.588 m (5' 2.5\")   Wt 51.3 kg (113 lb)   SpO2 97%   BMI 20.34 kg/m      Patient appears non toxic  Feet: bilat calluses over bony prominences    Results for orders placed or performed in visit on 06/12/19   *UA reflex to Microscopic and Culture ([facility] and [facility] (except [place] and [place])   Result Value Ref Range    Color Urine Yellow     Appearance Urine Clear     Glucose Urine Negative NEG^Negative mg/dL    Bilirubin Urine Negative NEG^Negative    Ketones Urine Negative NEG^Negative mg/dL    Specific Gravity Urine 1.020 1.003 - 1.035    Blood Urine Moderate (A) NEG^Negative    pH Urine 6.0 5.0 - 7.0 pH    Protein Albumin Urine 100 (A) NEG^Negative mg/dL    Urobilinogen Urine 0.2 0.2 - 1.0 EU/dL    Nitrite Urine Positive (A) NEG^Negative    Leukocyte Esterase Urine Large (A) NEG^Negative    Source Midstream Urine    Urine Microscopic   Result Value Ref Range    WBC Urine >100 (A) OTO5^0 - 5 /HPF    RBC Urine O - 2 OTO2^O - 2 /HPF    Bacteria Urine Many (A) NEG^Negative /HPF     Assessment and Plan:     (N39.0) Recurrent UTI (urinary tract infection)  (primary encounter diagnosis)  Comment:   Plan: Macrobid 100mg bid for 7 days. Push fluids. Repeat UA in 2 weeks. Then start prophylactic antibiotic given this the 3rd UTI since march.    (R30.0) Dysuria  Comment:   Plan: *UA reflex to Microscopic and Culture ([facility]         and [facility] (except [place] and         [place]), Urine Microscopic, nitroFURantoin         macrocrystal-monohydrate (MACROBID) 100 MG         capsule            (R82.90) Nonspecific finding on examination of urine  Comment:   Plan: Urine Culture Aerobic Bacterial            (Z87.440) Personal history of urinary tract infection  Comment:   Plan: **UA reflex to Microscopic FUTURE anytime            (L84) Callus of foot  Comment:   Plan: pumice stone, salicylic acid OTC      Tana Garcia PA-C            "

## 2019-06-14 LAB
BACTERIA SPEC CULT: ABNORMAL
SPECIMEN SOURCE: ABNORMAL

## 2019-06-14 NOTE — RESULT ENCOUNTER NOTE
Selena,    The urine culture did grow out E coli and is sensitive to the antibiotic I prescribed.   Let's repeat the urine as we discussed to make sure this clears up.    Tana Garcia PA-C

## 2019-06-24 ENCOUNTER — TELEPHONE (OUTPATIENT)
Dept: FAMILY MEDICINE | Facility: CLINIC | Age: 84
End: 2019-06-24

## 2019-06-24 DIAGNOSIS — N30.00 ACUTE CYSTITIS WITHOUT HEMATURIA: ICD-10-CM

## 2019-06-24 DIAGNOSIS — R82.90 NONSPECIFIC FINDING ON EXAMINATION OF URINE: Primary | ICD-10-CM

## 2019-06-24 DIAGNOSIS — Z87.440 PERSONAL HISTORY OF URINARY TRACT INFECTION: ICD-10-CM

## 2019-06-24 LAB
ALBUMIN UR-MCNC: 100 MG/DL
APPEARANCE UR: ABNORMAL
BACTERIA #/AREA URNS HPF: ABNORMAL /HPF
BILIRUB UR QL STRIP: NEGATIVE
COLOR UR AUTO: YELLOW
GLUCOSE UR STRIP-MCNC: NEGATIVE MG/DL
HGB UR QL STRIP: ABNORMAL
KETONES UR STRIP-MCNC: NEGATIVE MG/DL
LEUKOCYTE ESTERASE UR QL STRIP: ABNORMAL
NITRATE UR QL: NEGATIVE
NON-SQ EPI CELLS #/AREA URNS LPF: ABNORMAL /LPF
PH UR STRIP: 5.5 PH (ref 5–7)
RBC #/AREA URNS AUTO: ABNORMAL /HPF
SOURCE: ABNORMAL
SP GR UR STRIP: 1.02 (ref 1–1.03)
UROBILINOGEN UR STRIP-ACNC: 0.2 EU/DL (ref 0.2–1)
WBC #/AREA URNS AUTO: >100 /HPF

## 2019-06-24 PROCEDURE — 81001 URINALYSIS AUTO W/SCOPE: CPT | Performed by: PHYSICIAN ASSISTANT

## 2019-06-24 PROCEDURE — 87086 URINE CULTURE/COLONY COUNT: CPT | Performed by: INTERNAL MEDICINE

## 2019-06-24 RX ORDER — SULFAMETHOXAZOLE/TRIMETHOPRIM 800-160 MG
1 TABLET ORAL 2 TIMES DAILY
Qty: 14 TABLET | Refills: 0 | Status: SHIPPED | OUTPATIENT
Start: 2019-06-24 | End: 2020-02-11

## 2019-06-24 NOTE — TELEPHONE ENCOUNTER
PCP,    Pt wondering if she needs antibiotics based on UA today. Please advise    Thank you,  Mk CALL RN

## 2019-06-24 NOTE — TELEPHONE ENCOUNTER
Reason for Call:  Medication or medication refill:    Do you use a [facility]?  Name of the pharmacy and phone number for the current request:       [facility] [place] [address] [address]      Name of the medication requested: antibiotic for possible UTI    Other request: pt came in for a UA on 6/24/19.  If any antibiotics are needed please send to      [facility] [place] - [address] [facility]-    And call her Granddaughter Gayle [phone] CC on file  Can we leave a detailed message on this number? YES    Phone number patient can be reached at: [phone]    Best Time: any    Call taken on 6/24/2019 at 1:37 PM by Mayi Hoffman

## 2019-06-25 LAB
BACTERIA SPEC CULT: NORMAL
SPECIMEN SOURCE: NORMAL

## 2019-06-25 NOTE — TELEPHONE ENCOUNTER
Called granddaughter Gayle - no answer, left  informing her Bactrim was sent to pharmacy    Mk CALL RN

## 2019-06-26 NOTE — RESULT ENCOUNTER NOTE
Selena,    The urine culture was negative despite the large number of white blood cells seen on the analysis in our lab.  You can stop the antibiotic that my partner sent in after 3 days (last dose Wednesday night).  Are you having any symptoms such as burning on urination, urinary urgency, frequency, odor or cloudy urine?    Tana Garcia PA-C

## 2019-08-21 ENCOUNTER — TELEPHONE (OUTPATIENT)
Dept: FAMILY MEDICINE | Facility: CLINIC | Age: 84
End: 2019-08-21

## 2019-08-21 DIAGNOSIS — M81.0 OSTEOPOROSIS: Primary | ICD-10-CM

## 2019-08-21 DIAGNOSIS — M81.0 OSTEOPOROSIS, UNSPECIFIED OSTEOPOROSIS TYPE, UNSPECIFIED PATHOLOGICAL FRACTURE PRESENCE: ICD-10-CM

## 2019-08-22 NOTE — TELEPHONE ENCOUNTER
[employer] verification complete. Patient is responsible for 20% co-insurance (approximately $200). Patient can be scheduled for injection on or after 09/08/19. New order is needed for medication list to state that Prolia injection is every 6 months. Order is pended for signature by PCP.    David Thacker CMA on 8/22/2019 at 1:25 PM

## 2019-09-10 ENCOUNTER — ALLIED HEALTH/NURSE VISIT (OUTPATIENT)
Dept: NURSING | Facility: CLINIC | Age: 84
End: 2019-09-10
Payer: COMMERCIAL

## 2019-09-10 DIAGNOSIS — M81.0 AGE-RELATED OSTEOPOROSIS WITHOUT CURRENT PATHOLOGICAL FRACTURE: Primary | ICD-10-CM

## 2019-09-10 PROCEDURE — 96372 THER/PROPH/DIAG INJ SC/IM: CPT

## 2019-09-10 PROCEDURE — 99207 ZZC NO CHARGE NURSE ONLY: CPT

## 2019-09-10 NOTE — PROGRESS NOTES
Clinic Administered Medication Documentation    MEDICATION LIST:   Prolia Documentation    Prior to injection, verified patient identity using patient's name and date of birth. Medication was administered. Please see MAR and medication order for additional information. Patient instructed to remain in clinic for 15 minutes and report any adverse reaction to staff immediately .    Indication: Prolia  (denosumab) is a prescription medicine used to treat osteoporosis in patients who:     Are at high risk for fracture, meaning patients who have had a fracture related to osteoporosis, or who have multiple risk factors for fracture.    Cannot use another osteoporosis medicine or other osteoporosis medicines did not work well.    The timeline for early/late injections would be 4 weeks early and any time after the 6 month steve. If a patient receives their injection late, then the subsequent injection would be 6 months from the date that they actually received the injection.    1.  When was the last injection?  3/8/19  2.  Did they check with their insurance for this calendar year?  yes  3.  Is there an order in the chart?  yes  4.  Has the patient had dental work involving the bone in the past month or will have work in the next 6 months?  no    The following steps were completed to comply with the REMS program for Prolia:    Reviewed information in the Medication Guide and Patient Counseling Chart, including the serious risks of Prolia  and the symptoms of each risk.    Advised patient to seek prompt medical attention if they have signs or symptoms of any of the serious risks.    Provided each patient a copy of the Medication Guide and Patient Brochure.    Was entire vial of medication used? Yes  Vial/Syringe: Syringe  Expiration Date:  10/2021    Mk CALL RN

## 2019-12-15 ENCOUNTER — HEALTH MAINTENANCE LETTER (OUTPATIENT)
Age: 84
End: 2019-12-15

## 2020-02-11 ENCOUNTER — OFFICE VISIT (OUTPATIENT)
Dept: FAMILY MEDICINE | Facility: CLINIC | Age: 85
End: 2020-02-11
Payer: COMMERCIAL

## 2020-02-11 VITALS
DIASTOLIC BLOOD PRESSURE: 70 MMHG | HEIGHT: 63 IN | WEIGHT: 113 LBS | OXYGEN SATURATION: 97 % | SYSTOLIC BLOOD PRESSURE: 139 MMHG | TEMPERATURE: 97 F | HEART RATE: 68 BPM | BODY MASS INDEX: 20.02 KG/M2

## 2020-02-11 DIAGNOSIS — M25.511 ACUTE PAIN OF RIGHT SHOULDER: ICD-10-CM

## 2020-02-11 DIAGNOSIS — M81.0 OSTEOPOROSIS WITHOUT CURRENT PATHOLOGICAL FRACTURE, UNSPECIFIED OSTEOPOROSIS TYPE: ICD-10-CM

## 2020-02-11 DIAGNOSIS — R07.81 RIB PAIN ON RIGHT SIDE: ICD-10-CM

## 2020-02-11 DIAGNOSIS — Z91.81 PERSONAL HISTORY OF FALL: Primary | ICD-10-CM

## 2020-02-11 PROCEDURE — 99214 OFFICE O/P EST MOD 30 MIN: CPT | Performed by: INTERNAL MEDICINE

## 2020-02-11 ASSESSMENT — MIFFLIN-ST. JEOR: SCORE: 893.75

## 2020-02-11 NOTE — PROGRESS NOTES
"Subjective     Selena Jacobo is a 89 year old female who presents to clinic today for the following health issues:    Pt is accompanied by her granddaughter who helped give pt history. Pt lives at [facility].    HPI     Right side Fall  Fell on Saturday  Was washing a mirror in her living room  She was standing on a chair and fell off the chair  Fell on her right side  Denies any LOC or head injury  Pt c/o right side rib and shoulder pain and soreness  Unable to abduct right shoulder up  Took one tylenol on Sunday for pain    Non-compliant with medication per pt's granddaughter      Medications and Labs reviewed in EPIC    Reviewed and updated as needed this visit by Provider         Review of Systems   ROS COMP: Constitutional, HEENT, cardiovascular, pulmonary, GI, , musculoskeletal, neuro, skin, endocrine and psych systems are negative, except as otherwise noted.    POSITIVE for right side rib soreness  POSITIVE for right shoulder pain when abducting arm    This document serves as a record of the services and decisions personally performed and made by Katie Vega MD. It was created on her behalf by Jo Ann Patel, a trained medical scribe. The creation of this document is based on the provider's statements to the medical scribe.  Jo Ann Patel 8:49 AM February 11, 2020        Objective    /70   Pulse 68   Temp 97  F (36.1  C) (Oral)   Ht 1.588 m (5' 2.5\")   Wt 51.3 kg (113 lb)   SpO2 97%   Breastfeeding No   BMI 20.34 kg/m    Body mass index is 20.34 kg/m .  Physical Exam   GENERAL APPEARANCE: healthy, alert and no distress  EYES: Eyes grossly normal to inspection, PERRL and conjunctivae and sclerae normal  HENT: ear canals and TM's normal and nose and mouth without ulcers or lesions  NECK: no adenopathy   JOINT/EXTREMITIES: No acute tenderness in rib area, when abducting right shoulder she has pain on the lateral aspect, no bruising  RESP: lungs clear to auscultation - no rales, rhonchi " or wheezes  CV: regular rates and rhythm, normal S1 S2, no S3  PSYCH: mentation appears normal, affect normal/bright      Diagnostic Test Results:  Labs reviewed in Epic  none         Assessment & Plan     Selena was seen today for fall.    Diagnoses and all orders for this visit:    Personal history of fall  -     PHYSICAL THERAPY REFERRAL; Future  She is here today for an injury after a fall  Fell off a chair at home on her right side  Experiencing right side rib soreness and right shoulder pain   Pt experiencing pain while abducting her right shoulder  No tenderness upon palpation during exam  No bruising was noticed upon exam  Informed her that she probably injured her rotator cuff  Told her that an X-ray is not needed due to there being no acute tenderness  Advised her to ice the injured areas and use Tylenol for pain if needed  Pt is open to doing some PT at [facility]  A referral for PT has been ordered  [facility] will be notified of this  Informed pt that if symptoms persist, we can do imaging  Grand daughter agreed and said they brought her today to be on safe side to make sure she is ok.    Rib pain on right side  -     PHYSICAL THERAPY REFERRAL; Future  See Above    Acute pain of right shoulder  -     PHYSICAL THERAPY REFERRAL; Future  See Above    Osteoporosis without current pathological fracture, unspecified osteoporosis type  Stable and on prolia advised to take adequate calcium and vit d     BP elevated today (163/70)  BP rechecked in office and was 139/70    Patient Instructions   Ice prn  Take tylenol 500 mg every 4 hours as needed    Topical pain medication as needed   If symptoms does not improve or get worse then seek attention  We can refer you to orthopedics if needed   Do PHYSIC[facility] THERAPY at [facility] if available  Keep appointment with Tana as scheduled   Seek sooner medical attention if there is any worsening of symptoms or problems.        The information in this  document, created by the medical scribe for me, accurately reflects the services I personally performed and the decisions made by me. I have reviewed and approved this document for accuracy prior to leaving the patient care area.  February 11, 2020 9:04 AM    Katie Vega MD  [facility]

## 2020-02-18 ENCOUNTER — MEDICAL CORRESPONDENCE (OUTPATIENT)
Dept: HEALTH INFORMATION MANAGEMENT | Facility: CLINIC | Age: 85
End: 2020-02-18

## 2020-03-10 ENCOUNTER — MEDICAL CORRESPONDENCE (OUTPATIENT)
Dept: HEALTH INFORMATION MANAGEMENT | Facility: CLINIC | Age: 85
End: 2020-03-10

## 2020-03-11 ENCOUNTER — OFFICE VISIT (OUTPATIENT)
Dept: FAMILY MEDICINE | Facility: CLINIC | Age: 85
End: 2020-03-11
Payer: COMMERCIAL

## 2020-03-11 VITALS
DIASTOLIC BLOOD PRESSURE: 62 MMHG | HEIGHT: 63 IN | WEIGHT: 111.9 LBS | SYSTOLIC BLOOD PRESSURE: 114 MMHG | BODY MASS INDEX: 19.83 KG/M2 | TEMPERATURE: 97.8 F | OXYGEN SATURATION: 98 % | HEART RATE: 60 BPM

## 2020-03-11 DIAGNOSIS — M81.0 AGE-RELATED OSTEOPOROSIS WITHOUT CURRENT PATHOLOGICAL FRACTURE: ICD-10-CM

## 2020-03-11 DIAGNOSIS — Z00.00 MEDICARE ANNUAL WELLNESS VISIT, SUBSEQUENT: Primary | ICD-10-CM

## 2020-03-11 PROCEDURE — 80053 COMPREHEN METABOLIC PANEL: CPT | Performed by: PHYSICIAN ASSISTANT

## 2020-03-11 PROCEDURE — 36415 COLL VENOUS BLD VENIPUNCTURE: CPT | Performed by: PHYSICIAN ASSISTANT

## 2020-03-11 PROCEDURE — 99397 PER PM REEVAL EST PAT 65+ YR: CPT | Performed by: PHYSICIAN ASSISTANT

## 2020-03-11 PROCEDURE — 82306 VITAMIN D 25 HYDROXY: CPT | Performed by: PHYSICIAN ASSISTANT

## 2020-03-11 ASSESSMENT — ACTIVITIES OF DAILY LIVING (ADL)
CURRENT_FUNCTION: PREPARING MEALS REQUIRES ASSISTANCE
CURRENT_FUNCTION: MEDICATION ADMINISTRATION REQUIRES ASSISTANCE
CURRENT_FUNCTION: HOUSEWORK REQUIRES ASSISTANCE
CURRENT_FUNCTION: TRANSPORTATION REQUIRES ASSISTANCE

## 2020-03-11 ASSESSMENT — MIFFLIN-ST. JEOR: SCORE: 888.77

## 2020-03-11 NOTE — PROGRESS NOTES
SUBJECTIVE:   Selena Jacobo is a 89 year old female who presents for Preventive Visit.    Are you in the first 12 months of your Medicare coverage?  No    Dtr notes that pt forgets to take her vitamins  Pt is taking one Ensure per day  She is otherwise just on Prolia and due for that now.  Calcium: drinks 2 glasses of milk per day  Pt declines mammogram  Pt has up to date immunizations  Lives at [facility]  She walks in her building and also walks on the treadmill for 30+ minutes      Healthy Habits:     In general, how would you rate your overall health?  Very good    Frequency of exercise:  6-7 days/week    Duration of exercise:  15-30 minutes    Taking medications regularly:  Yes    Barriers to taking medications:  None    Medication side effects:  None    Ability to successfully perform activities of daily living:  Transportation requires assistance, preparing meals requires assistance, housework requires assistance and medication administration requires assistance    Home Safety:  Lack of grab bars in the bathroom    Hearing Impairment:  No hearing concerns    In the past 6 months, have you been bothered by leaking of urine?  No    In general, how would you rate your overall mental or emotional health?  Good      PHQ-2 Total Score: 0    Additional concerns today:  No    Do you feel safe in your environment? Yes    Have you ever done Advance Care Planning? (For example, a Health Directive, POLST, or a discussion with a medical provider or your loved ones about your wishes): Yes, advance care planning is on file.      Fall risk  Fallen 2 or more times in the past year?: No  Any fall with injury in the past year?: Yes  Timed Up and Go Test (>13.5 is fall risk; contact physician) : 9    Cognitive Screening   1) Repeat 3 items (Leader, Season, Table)    2) Clock draw: NORMAL  3) 3 item recall: Recalls NO objects   Results: 0 items recalled: PROBABLE COGNITIVE IMPAIRMENT, **INFORM PROVIDER**    Mini-CogTM  Copyright ROSANA Echevarria. Licensed by the author for use in [facility]; reprinted with permission (shiela@[facility]). All rights reserved.      Do you have sleep apnea, excessive snoring or daytime drowsiness?: no    Reviewed and updated as needed this visit by clinical staff  Tobacco  Allergies  Meds  Soc Hx        Reviewed and updated as needed this visit by Provider        Social History     Tobacco Use     Smoking status: Never Smoker     Smokeless tobacco: Never Used   Substance Use Topics     Alcohol use: Yes     Alcohol/week: 0.0 standard drinks     Comment: rare glass of wine  maybe a holiday / special occassion      If you drink alcohol do you typically have >3 drinks per day or >7 drinks per week? No    Alcohol Use 3/11/2020   Prescreen: >3 drinks/day or >7 drinks/week? No       Current providers sharing in care for this patient include:   Patient Care Team:  Tana Garcia PA-C as PCP - General (Internal Medicine)  Tana Garcia PA-C as Assigned PCP    The following health maintenance items are reviewed in Epic and correct as of today:  Health Maintenance   Topic Date Due     MEDICARE ANNUAL WELLNESS VISIT  11/12/2019     FALL RISK ASSESSMENT  02/11/2021     ADVANCE CARE PLANNING  08/30/2022     DTAP/TDAP/TD IMMUNIZATION (3 - Td) 06/10/2023     PHQ-2  Completed     INFLUENZA VACCINE  Completed     PNEUMOCOCCAL IMMUNIZATION 65+ LOW/MEDIUM RISK  Completed     ZOSTER IMMUNIZATION  Completed     IPV IMMUNIZATION  Aged Out     MENINGITIS IMMUNIZATION  Aged Out       Past Medical History:   Diagnosis Date     Generalized OA      GERD (gastroesophageal reflux disease)      Hyperlipidemia      Osteoporosis      Rotator cuff tear      UPJ stricture, acquired 1999    L retrograde uterteogram, balloon dilation     Past Surgical History:   Procedure Laterality Date     CATARACT IOL, RT/LT       Current Outpatient Medications   Medication Sig Dispense Refill     denosumab (PROLIA) 60 MG/ML SOSY injection  "Inject 1 mL (60 mg) Subcutaneous every 6 months 1 mL 0     Multiple Vitamins-Minerals (CENTRUM SILVER) per tablet Take 1 tablet by mouth daily       Ascorbic Acid (VITAMIN C PO) Take by mouth daily       aspirin 81 MG tablet Take 1 tablet (81 mg) by mouth daily 30 tablet      Cholecalciferol (VITAMIN D PO) Take  by mouth.       Omega-3 Fatty Acids (FISH OIL PO) Take  by mouth.           Review of Systems  Constitutional, HEENT, cardiovascular, pulmonary, GI, , musculoskeletal, neuro, skin, endocrine and psych systems are negative, except as otherwise noted.    OBJECTIVE:   /62 (BP Location: Left arm, Cuff Size: Adult Regular)   Pulse 60   Temp 97.8  F (36.6  C) (Oral)   Ht 1.588 m (5' 2.5\")   Wt 50.8 kg (111 lb 14.4 oz)   SpO2 98%   BMI 20.14 kg/m   Estimated body mass index is 20.14 kg/m  as calculated from the following:    Height as of this encounter: 1.588 m (5' 2.5\").    Weight as of this encounter: 50.8 kg (111 lb 14.4 oz).  Physical Exam  GENERAL APPEARANCE: healthy, alert and no distress  EYES: Eyes grossly normal to inspection, PERRL and conjunctivae and sclerae normal  HENT: ear canals and TM's normal, nose and mouth without ulcers or lesions, oropharynx clear and oral mucous membranes moist  NECK: no adenopathy, no asymmetry, masses, or scars and thyroid normal to palpation  RESP: lungs clear to auscultation - no rales, rhonchi or wheezes  BREAST: normal without masses, tenderness or nipple discharge and no palpable axillary masses or adenopathy  CV: regular rate and rhythm, normal S1 S2, no S3 or S4, no murmur, click or rub, no peripheral edema and peripheral pulses strong  ABDOMEN: soft, nontender, no hepatosplenomegaly, no masses and bowel sounds normal  MS: no musculoskeletal defects are noted and gait is age appropriate without ataxia  SKIN: no suspicious lesions or rashes  NEURO: Normal strength and tone, sensory exam grossly normal, mentation intact and speech normal  PSYCH: mentation " "appears normal and affect normal/bright        ASSESSMENT / PLAN:   Assessment and Plan:     (Z00.00) Medicare annual wellness visit, subsequent  (primary encounter diagnosis)  Comment: discussed fall prevention as she had recent fall from a chair she was standing on.  Declines mammogram.no need for lipids  Plan: Comprehensive metabolic panel        Immun up to date.    (M81.0) Age-related osteoporosis without current pathological fracture  Comment: scheduled prolia  Plan: Vitamin D level                COUNSELING:  Reviewed preventive health counseling, as reflected in patient instructions    Estimated body mass index is 20.14 kg/m  as calculated from the following:    Height as of this encounter: 1.588 m (5' 2.5\").    Weight as of this encounter: 50.8 kg (111 lb 14.4 oz).         reports that she has never smoked. She has never used smokeless tobacco.      Appropriate preventive services were discussed with this patient, including applicable screening as appropriate for cardiovascular disease, diabetes, osteopenia/osteoporosis, and glaucoma.  As appropriate for age/gender, discussed screening for colorectal cancer, prostate cancer, breast cancer, and cervical cancer. Checklist reviewing preventive services available has been given to the patient.    Reviewed patients plan of care and provided an AVS. The Basic Care Plan (routine screening as documented in Health Maintenance) for Selena meets the Care Plan requirement. This Care Plan has been established and reviewed with the Patient.    Counseling Resources:  ATP IV Guidelines  Pooled Cohorts Equation Calculator  Breast Cancer Risk Calculator  FRAX Risk Assessment  ICSI Preventive Guidelines  Dietary Guidelines for Americans, 2010  USDA's MyPlate  ASA Prophylaxis  Lung CA Screening    Tana Garcia PA-C  [facility]    Identified Health Risks:  "

## 2020-03-12 LAB
ALBUMIN SERPL-MCNC: 3.4 G/DL (ref 3.4–5)
ALP SERPL-CCNC: 91 U/L (ref 40–150)
ALT SERPL W P-5'-P-CCNC: 21 U/L (ref 0–50)
ANION GAP SERPL CALCULATED.3IONS-SCNC: 3 MMOL/L (ref 3–14)
AST SERPL W P-5'-P-CCNC: 17 U/L (ref 0–45)
BILIRUB SERPL-MCNC: 0.4 MG/DL (ref 0.2–1.3)
BUN SERPL-MCNC: 29 MG/DL (ref 7–30)
CALCIUM SERPL-MCNC: 9 MG/DL (ref 8.5–10.1)
CHLORIDE SERPL-SCNC: 109 MMOL/L (ref 94–109)
CO2 SERPL-SCNC: 29 MMOL/L (ref 20–32)
CREAT SERPL-MCNC: 0.6 MG/DL (ref 0.52–1.04)
DEPRECATED CALCIDIOL+CALCIFEROL SERPL-MC: 29 UG/L (ref 20–75)
GFR SERPL CREATININE-BSD FRML MDRD: 80 ML/MIN/{1.73_M2}
GLUCOSE SERPL-MCNC: 92 MG/DL (ref 70–99)
POTASSIUM SERPL-SCNC: 4.1 MMOL/L (ref 3.4–5.3)
PROT SERPL-MCNC: 7.2 G/DL (ref 6.8–8.8)
SODIUM SERPL-SCNC: 141 MMOL/L (ref 133–144)

## 2020-03-12 NOTE — RESULT ENCOUNTER NOTE
Selena,    Your vitamin D level is slightly low at 29.   Please start taking over the counter vitamin D3 2000u/day with food.  This is beneficial to your bone health.  Your blood sugar, electrolytes, kidney and liver function tests were normal.    Tana Garcia PA-C

## 2020-03-13 ENCOUNTER — ALLIED HEALTH/NURSE VISIT (OUTPATIENT)
Dept: NURSING | Facility: CLINIC | Age: 85
End: 2020-03-13
Payer: COMMERCIAL

## 2020-03-13 DIAGNOSIS — M81.0 OSTEOPOROSIS: Primary | ICD-10-CM

## 2020-03-13 PROCEDURE — 96372 THER/PROPH/DIAG INJ SC/IM: CPT

## 2020-03-13 PROCEDURE — 99207 ZZC NO CHARGE NURSE ONLY: CPT

## 2020-03-13 NOTE — NURSING NOTE
Clinic Administered Medication Documentation      Prolia Documentation    Prior to injection, verified patient identity using patient's name and date of birth. Medication was administered. Please see MAR and medication order for additional information. Patient instructed to remain in clinic for 15 minutes and report any adverse reaction to staff immediately .    Indication: Prolia  (denosumab) is a prescription medicine used to treat osteoporosis in patients who:     Are at high risk for fracture, meaning patients who have had a fracture related to osteoporosis, or who have multiple risk factors for fracture.    Cannot use another osteoporosis medicine or other osteoporosis medicines did not work well.    The timeline for early/late injections would be 4 weeks early and any time after the 6 month steve. If a patient receives their injection late, then the subsequent injection would be 6 months from the date that they actually received the injection.    1.  When was the last injection?  9/10/19  2.  Did they check with their insurance for this calendar year?  Daughter called on 3/11/2020 and states she was told 20 % copay.  ABN signed.  3.  Is there an order in the chart?  yes  4.  Has the patient had dental work involving the bone in the past month or will have work in the next 6 months?  NO    The following steps were completed to comply with the REMS program for Prolia:    Reviewed information in the Medication Guide and Patient Counseling Chart, including the serious risks of Prolia  and the symptoms of each risk.    Advised patient to seek prompt medical attention if they have signs or symptoms of any of the serious risks.    Provided each patient a copy of the Medication Guide and Patient Brochure.    Was entire vial of medication used? Yes  Vial/Syringe: Syringe  Expiration Date:  04/22  Was this medication supplied by the patient? No     Pilar Cramer RN on 3/13/2020 at 4:52 PM

## 2020-09-25 DIAGNOSIS — M81.0 OSTEOPOROSIS, UNSPECIFIED OSTEOPOROSIS TYPE, UNSPECIFIED PATHOLOGICAL FRACTURE PRESENCE: ICD-10-CM

## 2020-09-25 PROCEDURE — 36415 COLL VENOUS BLD VENIPUNCTURE: CPT | Performed by: PHYSICIAN ASSISTANT

## 2020-09-25 PROCEDURE — 82310 ASSAY OF CALCIUM: CPT | Performed by: PHYSICIAN ASSISTANT

## 2020-09-25 PROCEDURE — 82306 VITAMIN D 25 HYDROXY: CPT | Performed by: PHYSICIAN ASSISTANT

## 2020-09-26 LAB — CALCIUM SERPL-MCNC: 9.8 MG/DL (ref 8.5–10.1)

## 2020-09-27 ENCOUNTER — MYC MEDICAL ADVICE (OUTPATIENT)
Dept: FAMILY MEDICINE | Facility: CLINIC | Age: 85
End: 2020-09-27

## 2020-09-28 ENCOUNTER — TELEPHONE (OUTPATIENT)
Dept: FAMILY MEDICINE | Facility: CLINIC | Age: 85
End: 2020-09-28

## 2020-09-28 LAB — DEPRECATED CALCIDIOL+CALCIFEROL SERPL-MC: 38 UG/L (ref 20–75)

## 2020-09-28 NOTE — TELEPHONE ENCOUNTER
Reason for Call: Request for an order or referral:    Order or referral being requested: Prolia    Date needed: as soon as possible    Has the patient been seen by the PCP for this problem? YES    Additional comments: Family is hoping patient can be seen Tuesday 9/29 but will settle for sometime this week    Phone number Patient's daughter can be reached at:  Cell number on file:    Telephone Information:   Mobile [phone]       Best Time:  any    Can we leave a detailed message on this number?  YES    Call taken on 9/28/2020 at 4:53 PM by Yarelis Groves

## 2020-09-28 NOTE — RESULT ENCOUNTER NOTE
Selena,    Your vitamin D level and calcium level is normal so you can schedule your Prolia.    Tana Garcia PA-C

## 2020-10-02 ENCOUNTER — ALLIED HEALTH/NURSE VISIT (OUTPATIENT)
Dept: NURSING | Facility: CLINIC | Age: 85
End: 2020-10-02
Payer: COMMERCIAL

## 2020-10-02 DIAGNOSIS — M81.0 AGE-RELATED OSTEOPOROSIS WITHOUT CURRENT PATHOLOGICAL FRACTURE: Primary | ICD-10-CM

## 2020-10-02 PROCEDURE — 99207 PR NO CHARGE NURSE ONLY: CPT

## 2020-10-02 PROCEDURE — 96372 THER/PROPH/DIAG INJ SC/IM: CPT

## 2020-10-02 NOTE — NURSING NOTE
Clinic Administered Medication Documentation      Prolia Documentation    Prior to injection, verified patient identity using patient's name and date of birth. Medication was administered. Please see MAR and medication order for additional information. Patient instructed to remain in clinic for 15 minutes.    Indication: Prolia  (denosumab) is a prescription medicine used to treat osteoporosis in patients who:     Are at high risk for fracture, meaning patients who have had a fracture related to osteoporosis, or who have multiple risk factors for fracture.    Cannot use another osteoporosis medicine or other osteoporosis medicines did not work well.    The timeline for early/late injections would be 4 weeks early and any time after the 6 month steve. If a patient receives their injection late, then the subsequent injection would be 6 months from the date that they actually received the injection.    1.  When was the last injection?  3/13/2020  2.  Did they check with their insurance for this calendar year?  Yes  3.  Is there an order in the chart?  Yes  4.  Has the patient had dental work involving the bone in the past month or will have work in the next 6 months?  No    The following steps were completed to comply with the REMS program for Prolia:    Reviewed information in the Medication Guide and Patient Counseling Chart, including the serious risks of Prolia  and the symptoms of each risk.    Advised patient to seek prompt medical attention if they have signs or symptoms of any of the serious risks.    Provided each patient a copy of the Medication Guide and Patient Brochure.    Was entire vial of medication used? Yes  Vial/Syringe: Syringe  Expiration Date:  11/2022  Was this medication supplied by the patient? No     Ivonne Wong RN on 10/2/2020 at 2:19 PM

## 2021-01-06 ENCOUNTER — MYC MEDICAL ADVICE (OUTPATIENT)
Dept: FAMILY MEDICINE | Facility: CLINIC | Age: 86
End: 2021-01-06

## 2021-01-06 DIAGNOSIS — M81.0 OSTEOPOROSIS, UNSPECIFIED OSTEOPOROSIS TYPE, UNSPECIFIED PATHOLOGICAL FRACTURE PRESENCE: Primary | ICD-10-CM

## 2021-01-06 DIAGNOSIS — Z92.29 HISTORY OF BISPHOSPHONATE THERAPY: ICD-10-CM

## 2021-01-12 RX ORDER — D-METHORPHAN/PE/ACETAMINOPHEN 10-5-325MG
CAPSULE ORAL
COMMUNITY
Start: 2021-01-12

## 2021-01-12 RX ORDER — LACTOSE-REDUCED FOOD
LIQUID (ML) ORAL
COMMUNITY
Start: 2021-01-12 | End: 2022-05-24

## 2021-01-27 NOTE — TELEPHONE ENCOUNTER
New order for Prolia pended for signature per recommendation of [facility] team needing secondary diagnosis code. Added in Z92.29 History of Bisphosphonate Therapy as patient has been on Raloxifene (Evista) in the past.    David Thacker, IMANI on 1/27/2021 at 8:44 AM

## 2021-02-04 ENCOUNTER — TELEPHONE (OUTPATIENT)
Dept: FAMILY MEDICINE | Facility: CLINIC | Age: 86
End: 2021-02-04

## 2021-02-04 DIAGNOSIS — M81.0 OSTEOPOROSIS, UNSPECIFIED OSTEOPOROSIS TYPE, UNSPECIFIED PATHOLOGICAL FRACTURE PRESENCE: Primary | ICD-10-CM

## 2021-02-04 DIAGNOSIS — Z92.29 HISTORY OF BISPHOSPHONATE THERAPY: ICD-10-CM

## 2021-02-04 NOTE — TELEPHONE ENCOUNTER
New order for Prolia pended with diagnosis codes of Osteoporosis and Previous History of Bisphosphonate Therapy as she has taken Raloxifene (Evista) in the past.    David Thacker, CMA on 2/4/2021 at 9:04 AM

## 2021-02-04 NOTE — TELEPHONE ENCOUNTER
----- Message from Tana Garcia PA-C sent at 1/29/2021  3:35 PM CST -----  Regarding: FW: Prolia  Can you help with this?    Thanks,  Tana    ----- Message -----  From: Cathi Hernandes  Sent: 1/26/2021   1:52 PM CST  To: Tana Garcia PA-C  Subject: FW: Prolia                                       Justen Fajardo,    I just wanted to follow up in regard to the CAM medication order needing a secondary DX.  Please see list below for covered secondary DX under Medicare guidelines.    Thank you,    Cathi Barrett  ----- Message -----  From: Cathi Hernandes  Sent: 1/19/2021   2:46 PM CST  To: Tana Garcia PA-C  Subject: Bella                                           Robert Garcia,     This patient is scheduled for prolia.  With their insurance coverage, it follows Medicare's NGS policy.  Currently we have the DX M81.0 but this request will need an additional diagnosis to support why this patient is not appropriate for other therapies.  Below is a list of secondary diagnosis that would follow Medicare's policy.     Z92.29 - Personal history of other drug therapy     T50.995S - Adverse effect of other drugs, medicaments and biological substances, sequela  T88.7XXS - Unspecified adverse effect of drug or medicament, sequela  Z88.8 - Allergy status to other drugs, medicaments and biological substances status    N18.30 Chronic kidney disease, stage 3 unspecified   N18.31 Chronic kidney disease, stage 3a   N18.32 Chronic kidney disease, stage 3b   N18.4 Chronic kidney disease, stage 4 (severe)   N18.5 Chronic kidney disease, stage 5    Would an additional diagnosis code apply to this patient? If so, can you please have this added to the CAM order as the secondary diagnosis?  Please let me know when this is completed and I can proceed with verifying patient's benefit for this medication.    Thank you,    Cathi Hernandes

## 2021-03-04 ENCOUNTER — MEDICAL CORRESPONDENCE (OUTPATIENT)
Dept: HEALTH INFORMATION MANAGEMENT | Facility: CLINIC | Age: 86
End: 2021-03-04

## 2021-03-16 ENCOUNTER — OFFICE VISIT (OUTPATIENT)
Dept: FAMILY MEDICINE | Facility: CLINIC | Age: 86
End: 2021-03-16
Payer: COMMERCIAL

## 2021-03-16 VITALS
BODY MASS INDEX: 16.66 KG/M2 | DIASTOLIC BLOOD PRESSURE: 62 MMHG | HEART RATE: 57 BPM | HEIGHT: 63 IN | SYSTOLIC BLOOD PRESSURE: 114 MMHG | TEMPERATURE: 97.8 F | WEIGHT: 94 LBS | OXYGEN SATURATION: 99 %

## 2021-03-16 DIAGNOSIS — M81.0 OSTEOPOROSIS, UNSPECIFIED OSTEOPOROSIS TYPE, UNSPECIFIED PATHOLOGICAL FRACTURE PRESENCE: ICD-10-CM

## 2021-03-16 DIAGNOSIS — Z78.0 POSTMENOPAUSAL STATUS: ICD-10-CM

## 2021-03-16 DIAGNOSIS — Z00.00 MEDICARE ANNUAL WELLNESS VISIT, SUBSEQUENT: Primary | ICD-10-CM

## 2021-03-16 DIAGNOSIS — N39.0 RECURRENT UTI (URINARY TRACT INFECTION): ICD-10-CM

## 2021-03-16 DIAGNOSIS — R41.3 MEMORY LOSS: ICD-10-CM

## 2021-03-16 LAB
ALBUMIN UR-MCNC: ABNORMAL MG/DL
APPEARANCE UR: CLEAR
BACTERIA #/AREA URNS HPF: ABNORMAL /HPF
BILIRUB UR QL STRIP: NEGATIVE
COLOR UR AUTO: YELLOW
ERYTHROCYTE [DISTWIDTH] IN BLOOD BY AUTOMATED COUNT: 12.2 % (ref 10–15)
GLUCOSE UR STRIP-MCNC: NEGATIVE MG/DL
HCT VFR BLD AUTO: 36.3 % (ref 35–47)
HGB BLD-MCNC: 11.8 G/DL (ref 11.7–15.7)
HGB UR QL STRIP: ABNORMAL
KETONES UR STRIP-MCNC: NEGATIVE MG/DL
LEUKOCYTE ESTERASE UR QL STRIP: ABNORMAL
MCH RBC QN AUTO: 34.5 PG (ref 26.5–33)
MCHC RBC AUTO-ENTMCNC: 32.5 G/DL (ref 31.5–36.5)
MCV RBC AUTO: 106 FL (ref 78–100)
NITRATE UR QL: POSITIVE
NON-SQ EPI CELLS #/AREA URNS LPF: ABNORMAL /LPF
PH UR STRIP: 6 PH (ref 5–7)
PLATELET # BLD AUTO: 172 10E9/L (ref 150–450)
RBC # BLD AUTO: 3.42 10E12/L (ref 3.8–5.2)
RBC #/AREA URNS AUTO: ABNORMAL /HPF
SOURCE: ABNORMAL
SP GR UR STRIP: 1.02 (ref 1–1.03)
UROBILINOGEN UR STRIP-ACNC: 0.2 EU/DL (ref 0.2–1)
VIT B12 SERPL-MCNC: 688 PG/ML (ref 193–986)
WBC # BLD AUTO: 6.4 10E9/L (ref 4–11)
WBC #/AREA URNS AUTO: ABNORMAL /HPF

## 2021-03-16 PROCEDURE — 83735 ASSAY OF MAGNESIUM: CPT | Performed by: PHYSICIAN ASSISTANT

## 2021-03-16 PROCEDURE — 80053 COMPREHEN METABOLIC PANEL: CPT | Performed by: PHYSICIAN ASSISTANT

## 2021-03-16 PROCEDURE — 87088 URINE BACTERIA CULTURE: CPT | Performed by: PHYSICIAN ASSISTANT

## 2021-03-16 PROCEDURE — 36415 COLL VENOUS BLD VENIPUNCTURE: CPT | Performed by: PHYSICIAN ASSISTANT

## 2021-03-16 PROCEDURE — 87186 SC STD MICRODIL/AGAR DIL: CPT | Performed by: PHYSICIAN ASSISTANT

## 2021-03-16 PROCEDURE — 85027 COMPLETE CBC AUTOMATED: CPT | Performed by: PHYSICIAN ASSISTANT

## 2021-03-16 PROCEDURE — 99213 OFFICE O/P EST LOW 20 MIN: CPT | Mod: 25 | Performed by: PHYSICIAN ASSISTANT

## 2021-03-16 PROCEDURE — 84443 ASSAY THYROID STIM HORMONE: CPT | Performed by: PHYSICIAN ASSISTANT

## 2021-03-16 PROCEDURE — 81001 URINALYSIS AUTO W/SCOPE: CPT | Performed by: PHYSICIAN ASSISTANT

## 2021-03-16 PROCEDURE — 99397 PER PM REEVAL EST PAT 65+ YR: CPT | Performed by: PHYSICIAN ASSISTANT

## 2021-03-16 PROCEDURE — 87086 URINE CULTURE/COLONY COUNT: CPT | Performed by: PHYSICIAN ASSISTANT

## 2021-03-16 PROCEDURE — 82607 VITAMIN B-12: CPT | Performed by: PHYSICIAN ASSISTANT

## 2021-03-16 ASSESSMENT — ACTIVITIES OF DAILY LIVING (ADL): CURRENT_FUNCTION: NO ASSISTANCE NEEDED

## 2021-03-16 ASSESSMENT — MIFFLIN-ST. JEOR: SCORE: 802.89

## 2021-03-16 NOTE — PROGRESS NOTES
"SUBJECTIVE:   Selena Jacobo is a 89 year old female who presents for Preventive Visit.    Pt here with dtr Dimple who is visiting from out of state.   She will be in town for a few more days and then plans to return in June for another visit.    Pt has prolia shot on Thursday and needs labs prior to that.  No UTI sxs but dtr wants her screened given hx of UTIs  Dimple notes pt's short term memory seem worse in the past year, but hasn't been able to visit her at [facility] (independent living) due to COVID  Pt sometimes mentions people that are not there.  She is not able to recall the year or her exact age.  Pt lives in independent living at [facility] where she does receive an evening meal.   She is able to bathe and dress herself.  She has snacks in her kitchen but Dimple notes she hasn't been eating these.  She does drink Ensure/Boost at least one per day  She has lost weight in the past year (94lbs compared to 111lbs last year)  Her dining room will open soon and Dimple believe this will help the pt who has felt very isolated during the COVID pandemic. She used to enjoy eating meals with friends, but hasn't been able to do that.  Pt admits she is sad about the isolation during the past year.  She does drink Ensure/Boost at least one per day        Patient has been advised of split billing requirements and indicates understanding: Yes   Are you in the first 12 months of your Medicare coverage?  No    Healthy Habits:     In general, how would you rate your overall health?  Very good    Frequency of exercise:: walking     Do you usually eat at least 4 servings of fruit and vegetables a day, include whole grains    & fiber and avoid regularly eating high fat or \"junk\" foods?  No    Taking medications regularly:  Not Applicable    Barriers to taking medications:  Problems remembering to take them    Medication side effects:  Not applicable    Ability to successfully perform activities of daily living:  No " assistance needed    Home Safety:  No safety concerns identified    Hearing Impairment:  No hearing concerns    In the past 6 months, have you been bothered by leaking of urine?  No    In general, how would you rate your overall mental or emotional health?  Very good      PHQ-2 Total Score: 0    Additional concerns today:  Yes    Do you feel safe in your environment? Yes    Have you ever done Advance Care Planning? (For example, a Health Directive, POLST, or a discussion with a medical provider or your loved ones about your wishes): Yes, advance care planning is on file.       Fall risk  Fallen 2 or more times in the past year?: No  Any fall with injury in the past year?: No    Cognitive Screening Not appropriate due to short term memory    Do you have sleep apnea, excessive snoring or daytime drowsiness?: no    Reviewed and updated as needed this visit by clinical staff  Tobacco  Allergies  Meds              Reviewed and updated as needed this visit by Provider                Social History     Tobacco Use     Smoking status: Never Smoker     Smokeless tobacco: Never Used   Substance Use Topics     Alcohol use: Yes     Alcohol/week: 0.0 standard drinks     Comment: rare glass of wine  maybe a holiday / special occassion      If you drink alcohol do you typically have >3 drinks per day or >7 drinks per week? No    Alcohol Use 3/11/2020   Prescreen: >3 drinks/day or >7 drinks/week? No       Current providers sharing in care for this patient include:   Patient Care Team:  Tana Garcia PA-C as PCP - General (Internal Medicine)  Tana Garcia PA-C as Assigned PCP    The following health maintenance items are reviewed in Epic and correct as of today:  Health Maintenance   Topic Date Due     FALL RISK ASSESSMENT  03/11/2021     MEDICARE ANNUAL WELLNESS VISIT  03/16/2022     DTAP/TDAP/TD IMMUNIZATION (3 - Td) 06/10/2023     ADVANCE CARE PLANNING  03/11/2025     PHQ-2  Completed     INFLUENZA VACCINE  Completed  "    Pneumococcal Vaccine: 65+ Years  Completed     ZOSTER IMMUNIZATION  Completed     COVID-19 Vaccine  Completed     Pneumococcal Vaccine: Pediatrics (0 to 5 Years) and At-Risk Patients (6 to 64 Years)  Aged Out     IPV IMMUNIZATION  Aged Out     MENINGITIS IMMUNIZATION  Aged Out     HEPATITIS B IMMUNIZATION  Aged Out       Past Medical History:   Diagnosis Date     Generalized OA      GERD (gastroesophageal reflux disease)      Hyperlipidemia      Osteoporosis      Rotator cuff tear      UPJ stricture, acquired 1999    L retrograde uterteogram, balloon dilation     Past Surgical History:   Procedure Laterality Date     CATARACT IOL, RT/LT       Current Outpatient Medications   Medication Sig Dispense Refill     Ascorbic Acid (VITAMIN C PO) Take by mouth daily       Calcium-Phosphorus-Vitamin D (CITRACAL CALCIUM GUMMIES) 250-115-250 MG-MG-UNIT CHEW        denosumab (PROLIA) 60 MG/ML SOSY injection Inject 1 mL (60 mg) Subcutaneous every 6 months 1 mL 0     Multiple Vitamins-Minerals (CENTRUM SILVER) per tablet Take 1 tablet by mouth daily       Nutritional Supplements (BOOST HIGH PROTEIN) LIQD        Nutritional Supplements (ENSURE ACTIVE HIGH PROTEIN) LIQD        aspirin 81 MG tablet Take 1 tablet (81 mg) by mouth daily 30 tablet          Review of Systems  Constitutional, HEENT, cardiovascular, pulmonary, gi and gu systems are negative, except as otherwise noted.    OBJECTIVE:   /62 (BP Location: Right arm, Patient Position: Sitting)   Pulse 57   Temp 97.8  F (36.6  C) (Temporal)   Ht 1.588 m (5' 2.52\")   Wt 42.6 kg (94 lb)   SpO2 99%   BMI 16.91 kg/m   Estimated body mass index is 16.91 kg/m  as calculated from the following:    Height as of this encounter: 1.588 m (5' 2.52\").    Weight as of this encounter: 42.6 kg (94 lb).  Physical Exam  GENERAL APPEARANCE: alert, thin and frail  RESP: lungs clear to auscultation - no rales, rhonchi or wheezes  CV: regular rate and rhythm, normal S1 S2, no S3 or " S4, no murmur, click or rub, no peripheral edema and peripheral pulses strong  MS: no musculoskeletal defects are noted and gait is age appropriate without ataxia  SKIN: no suspicious lesions or rashes  PSYCH: mentation appears normal and affect normal/bright    Results for orders placed or performed in visit on 03/16/21   *UA reflex to Microscopic and Culture ([place] and [facility] (except [place] and [place])     Status: Abnormal    Specimen: Midstream Urine   Result Value Ref Range    Color Urine Yellow     Appearance Urine Clear     Glucose Urine Negative NEG^Negative mg/dL    Bilirubin Urine Negative NEG^Negative    Ketones Urine Negative NEG^Negative mg/dL    Specific Gravity Urine 1.025 1.003 - 1.035    Blood Urine Trace (A) NEG^Negative    pH Urine 6.0 5.0 - 7.0 pH    Protein Albumin Urine Trace (A) NEG^Negative mg/dL    Urobilinogen Urine 0.2 0.2 - 1.0 EU/dL    Nitrite Urine Positive (A) NEG^Negative    Leukocyte Esterase Urine Trace (A) NEG^Negative    Source Midstream Urine    CBC with platelets     Status: Abnormal   Result Value Ref Range    WBC 6.4 4.0 - 11.0 10e9/L    RBC Count 3.42 (L) 3.8 - 5.2 10e12/L    Hemoglobin 11.8 11.7 - 15.7 g/dL    Hematocrit 36.3 35.0 - 47.0 %     (H) 78 - 100 fl    MCH 34.5 (H) 26.5 - 33.0 pg    MCHC 32.5 31.5 - 36.5 g/dL    RDW 12.2 10.0 - 15.0 %    Platelet Count 172 150 - 450 10e9/L   Vitamin B12     Status: None   Result Value Ref Range    Vitamin B12 688 193 - 986 pg/mL   Urine Microscopic     Status: Abnormal   Result Value Ref Range    WBC Urine 10-25 (A) OTO5^0 - 5 /HPF    RBC Urine 5-10 (A) OTO2^O - 2 /HPF    Squamous Epithelial /LPF Urine Few FEW^Few /LPF    Bacteria Urine Many (A) NEG^Negative /HPF   Urine Culture Aerobic Bacterial     Status: None (Preliminary result)    Specimen: Midstream Urine   Result Value Ref Range    Specimen Description Midstream Urine     Special Requests Specimen received in preservative     Culture Micro PENDING   "        ASSESSMENT / PLAN:   Assessment and Plan:     (Z00.00) Medicare annual wellness visit, subsequent  (primary encounter diagnosis)  Comment: pt declines breast ca screening due to age.  Immun including covid vaccines up to date.  Plan: Comprehensive metabolic panel, Magnesium, CBC         with platelets            (R41.3) Memory loss  Comment: Referred to Asiya Garzon. Dtr Dimple will be back in town in June and could try to set up an appt for that time for further evaluation.  There is testing that can be done at [facility] but would recd pt see Dr. Garzon instead at this point as pt and dtr prefer pt stay in independent living and feel she is safe there. Pt and dtr with concerns about her memory dating back at least 3 years so this is not new, but likely worse due to the global pandemic.  Plan: MEMORY CLINIC REFERRAL, TSH with free T4         reflex, Vitamin B12            (N39.0) Recurrent UTI (urinary tract infection)  Comment:   Plan: *UA reflex to Microscopic and Culture ([place]         and [facility] (except [place] and         [place]), Urine Culture Aerobic Bacterial,         Urine Microscopic            (M81.0) Osteoporosis, unspecified osteoporosis type, unspecified pathological fracture presence  Comment:   Plan: pt schedule for Prolia later this week.    (Z78.0) Postmenopausal status  Comment:   Plan: DX Hip/Pelvis/Spine                  Patient has been advised of split billing requirements and indicates understanding: Yes  COUNSELING:  Reviewed preventive health counseling, as reflected in patient instructions    Estimated body mass index is 16.91 kg/m  as calculated from the following:    Height as of this encounter: 1.588 m (5' 2.52\").    Weight as of this encounter: 42.6 kg (94 lb).        She reports that she has never smoked. She has never used smokeless tobacco.      Appropriate preventive services were discussed with this patient, including applicable screening as " appropriate for cardiovascular disease, diabetes, osteopenia/osteoporosis, and glaucoma.  As appropriate for age/gender, discussed screening for colorectal cancer, prostate cancer, breast cancer, and cervical cancer. Checklist reviewing preventive services available has been given to the patient.    Reviewed patients plan of care and provided an AVS. The Basic Care Plan (routine screening as documented in Health Maintenance) for Selena meets the Care Plan requirement. This Care Plan has been established and reviewed with the Patient and daughter.    Counseling Resources:  ATP IV Guidelines  Pooled Cohorts Equation Calculator  Breast Cancer Risk Calculator  Breast Cancer: Medication to Reduce Risk  FRAX Risk Assessment  ICSI Preventive Guidelines  Dietary Guidelines for Americans, 2010  USDA's MyPlate  ASA Prophylaxis  Lung CA Screening    DEBORAH [facility]    Identified Health Risks:

## 2021-03-17 ENCOUNTER — TELEPHONE (OUTPATIENT)
Dept: FAMILY MEDICINE | Facility: CLINIC | Age: 86
End: 2021-03-17

## 2021-03-17 LAB
ALBUMIN SERPL-MCNC: 3.8 G/DL (ref 3.4–5)
ALP SERPL-CCNC: 52 U/L (ref 40–150)
ALT SERPL W P-5'-P-CCNC: 16 U/L (ref 0–50)
ANION GAP SERPL CALCULATED.3IONS-SCNC: 3 MMOL/L (ref 3–14)
AST SERPL W P-5'-P-CCNC: 13 U/L (ref 0–45)
BACTERIA SPEC CULT: ABNORMAL
BILIRUB SERPL-MCNC: 0.4 MG/DL (ref 0.2–1.3)
BUN SERPL-MCNC: 37 MG/DL (ref 7–30)
CALCIUM SERPL-MCNC: 8.8 MG/DL (ref 8.5–10.1)
CHLORIDE SERPL-SCNC: 112 MMOL/L (ref 94–109)
CO2 SERPL-SCNC: 27 MMOL/L (ref 20–32)
CREAT SERPL-MCNC: 0.63 MG/DL (ref 0.52–1.04)
GFR SERPL CREATININE-BSD FRML MDRD: 78 ML/MIN/{1.73_M2}
GLUCOSE SERPL-MCNC: 88 MG/DL (ref 70–99)
Lab: ABNORMAL
MAGNESIUM SERPL-MCNC: 2.5 MG/DL (ref 1.6–2.3)
POTASSIUM SERPL-SCNC: 4.2 MMOL/L (ref 3.4–5.3)
PROT SERPL-MCNC: 7.4 G/DL (ref 6.8–8.8)
SODIUM SERPL-SCNC: 142 MMOL/L (ref 133–144)
SPECIMEN SOURCE: ABNORMAL
TSH SERPL DL<=0.005 MIU/L-ACNC: 3.12 MU/L (ref 0.4–4)

## 2021-03-17 RX ORDER — CIPROFLOXACIN 250 MG/1
250 TABLET, FILM COATED ORAL 2 TIMES DAILY
Qty: 14 TABLET | Refills: 0 | Status: SHIPPED | OUTPATIENT
Start: 2021-03-17 | End: 2021-05-06

## 2021-03-17 NOTE — ADDENDUM NOTE
Addended by: MARI LEON on: 3/17/2021 03:28 PM     Modules accepted: Orders     Pt complains of abdominal pain and feels like he is a screwdriver in his front and out his back. Left yesterday but didn't get into the shelter so slept in \"the field\" and is back for high blood sugar.

## 2021-03-17 NOTE — RESULT ENCOUNTER NOTE
Call pt and let her know that she does have UTI.  I will send in antibiotic that she should start  Her labs were otherwise unremarkable.

## 2021-03-17 NOTE — TELEPHONE ENCOUNTER
I reached daughter, Dimple on mobile. PLEASE advise if patient is still okay to receive her PROLIA injection tomorrow afternoon while being treated with Cipro for a bladder infection.   Patient is afebrile.    Will call Dimple in a.m. if any reason to postpone injection.    Thank you,  Pilar Cramer RN on 3/17/2021 at 6:18 PM

## 2021-03-18 ENCOUNTER — ALLIED HEALTH/NURSE VISIT (OUTPATIENT)
Dept: NURSING | Facility: CLINIC | Age: 86
End: 2021-03-18
Payer: COMMERCIAL

## 2021-03-18 DIAGNOSIS — M81.0 OSTEOPOROSIS: Primary | ICD-10-CM

## 2021-03-18 PROCEDURE — 99207 PR NO CHARGE NURSE ONLY: CPT

## 2021-03-18 PROCEDURE — 96372 THER/PROPH/DIAG INJ SC/IM: CPT

## 2021-03-18 NOTE — RESULT ENCOUNTER NOTE
Selena,    Your blood work was unremarkable except for the urinary tract infection as noted in another note.    Tana Garcia PA-C

## 2021-03-18 NOTE — NURSING NOTE
Clinic Administered Medication Documentation      Prolia Documentation    Prior to injection, verified patient identity using patient's name and date of birth. Medication was administered. Please see MAR and medication order for additional information. Patient instructed to remain in clinic for 15 minutes and report any adverse reaction to staff immediately .    Indication: Prolia  (denosumab) is a prescription medicine used to treat osteoporosis in patients who:     Are at high risk for fracture, meaning patients who have had a fracture related to osteoporosis, or who have multiple risk factors for fracture.    Cannot use another osteoporosis medicine or other osteoporosis medicines did not work well.    The timeline for early/late injections would be 4 weeks early and any time after the 6 month steve. If a patient receives their injection late, then the subsequent injection would be 6 months from the date that they actually received the injection.    1.  When was the last injection?  October 2, 2020  2.  Did they check with their insurance for this calendar year?  yes  3.  Is there an order in the chart?  yes  4.  Has the patient had dental work involving the bone in the past month or will have work in the next 6 months?  No. Has dental cleaning only    The following steps were completed to comply with the REMS program for Prolia:    Reviewed information in the Medication Guide and Patient Counseling Chart, including the serious risks of Prolia  and the symptoms of each risk.    Advised patient to seek prompt medical attention if they have signs or symptoms of any of the serious risks.    Provided each patient a copy of the Medication Guide and Patient Brochure.    Was entire vial of medication used? Yes  Vial/Syringe: Syringe  Expiration Date:  05/23  Was this medication supplied by the patient? No     Insurance 01/27/2021 11:46 Cathi Baca 2021 INSURANCE COVERAGE -   Note    Insurance name: [employer]  Medicare Advantage (A + B Replacement)   Location: [facility] $: 400  Met for this year: No  Amount met per claims processed to date $: 0  % covered: 80    OOP$: 1500   Met for this year: No    Amount met per claims processed to date $: 0                Pilar Cramer RN on 3/18/2021 at 3:00 PM

## 2021-03-18 NOTE — RESULT ENCOUNTER NOTE
Selena,    Your urine culture grew out E coli and shows the cipro should take care of that.  Your can still have your prolia shot even though you have this infection.    Tana Garcia PA-C

## 2021-03-19 ENCOUNTER — HOSPITAL ENCOUNTER (OUTPATIENT)
Dept: BONE DENSITY | Facility: CLINIC | Age: 86
Discharge: HOME | End: 2021-03-19
Attending: PHYSICIAN ASSISTANT | Admitting: PHYSICIAN ASSISTANT
Payer: COMMERCIAL

## 2021-03-19 DIAGNOSIS — Z78.0 POSTMENOPAUSAL STATUS: ICD-10-CM

## 2021-03-19 PROCEDURE — 77085 DXA BONE DENSITY AXL VRT FX: CPT

## 2021-03-28 NOTE — RESULT ENCOUNTER NOTE
Selena,    Your bone density shows that your bones are stable. Please continue the Prolia and calcium supplement.    Tana Garcia PA-C

## 2021-04-01 DIAGNOSIS — Z87.440 PERSONAL HISTORY OF URINARY TRACT INFECTION: Primary | ICD-10-CM

## 2021-05-05 DIAGNOSIS — Z87.440 PERSONAL HISTORY OF URINARY TRACT INFECTION: Primary | ICD-10-CM

## 2021-05-05 DIAGNOSIS — R30.0 DYSURIA: ICD-10-CM

## 2021-05-05 LAB
ALBUMIN UR-MCNC: NEGATIVE MG/DL
APPEARANCE UR: CLEAR
BACTERIA #/AREA URNS HPF: ABNORMAL /HPF
BILIRUB UR QL STRIP: NEGATIVE
COLOR UR AUTO: YELLOW
GLUCOSE UR STRIP-MCNC: NEGATIVE MG/DL
HGB UR QL STRIP: NEGATIVE
KETONES UR STRIP-MCNC: NEGATIVE MG/DL
LEUKOCYTE ESTERASE UR QL STRIP: ABNORMAL
MUCOUS THREADS #/AREA URNS LPF: PRESENT /LPF
NITRATE UR QL: NEGATIVE
NON-SQ EPI CELLS #/AREA URNS LPF: ABNORMAL /LPF
PH UR STRIP: 5.5 PH (ref 5–7)
RBC #/AREA URNS AUTO: ABNORMAL /HPF
SOURCE: ABNORMAL
SP GR UR STRIP: >1.03 (ref 1–1.03)
UROBILINOGEN UR STRIP-ACNC: 0.2 EU/DL (ref 0.2–1)
WBC #/AREA URNS AUTO: ABNORMAL /HPF

## 2021-05-05 PROCEDURE — 87086 URINE CULTURE/COLONY COUNT: CPT | Performed by: INTERNAL MEDICINE

## 2021-05-05 PROCEDURE — 81001 URINALYSIS AUTO W/SCOPE: CPT | Performed by: PHYSICIAN ASSISTANT

## 2021-05-06 ENCOUNTER — OFFICE VISIT (OUTPATIENT)
Dept: FAMILY MEDICINE | Facility: CLINIC | Age: 86
End: 2021-05-06
Payer: COMMERCIAL

## 2021-05-06 VITALS
HEART RATE: 62 BPM | HEIGHT: 63 IN | DIASTOLIC BLOOD PRESSURE: 63 MMHG | BODY MASS INDEX: 16.46 KG/M2 | TEMPERATURE: 97.9 F | SYSTOLIC BLOOD PRESSURE: 116 MMHG | WEIGHT: 92.9 LBS | OXYGEN SATURATION: 98 %

## 2021-05-06 DIAGNOSIS — F03.90 DEMENTIA WITHOUT BEHAVIORAL DISTURBANCE, UNSPECIFIED DEMENTIA TYPE: Primary | ICD-10-CM

## 2021-05-06 LAB
BACTERIA SPEC CULT: NO GROWTH
Lab: NORMAL
SPECIMEN SOURCE: NORMAL

## 2021-05-06 PROCEDURE — 99417 PROLNG OP E/M EACH 15 MIN: CPT | Performed by: INTERNAL MEDICINE

## 2021-05-06 PROCEDURE — 99215 OFFICE O/P EST HI 40 MIN: CPT | Performed by: INTERNAL MEDICINE

## 2021-05-06 RX ORDER — DONEPEZIL HYDROCHLORIDE 5 MG/1
5 TABLET, FILM COATED ORAL DAILY
Qty: 30 TABLET | Refills: 2 | Status: SHIPPED | OUTPATIENT
Start: 2021-05-06 | End: 2021-05-06

## 2021-05-06 RX ORDER — DONEPEZIL HYDROCHLORIDE 5 MG/1
5 TABLET, FILM COATED ORAL DAILY
Qty: 60 TABLET | Refills: 1 | Status: SHIPPED | OUTPATIENT
Start: 2021-05-06 | End: 2021-09-09

## 2021-05-06 RX ORDER — VIT A/VIT C/VIT E/ZINC/COPPER 2148-113
TABLET ORAL DAILY
COMMUNITY
End: 2022-06-07

## 2021-05-06 ASSESSMENT — MIFFLIN-ST. JEOR: SCORE: 797.58

## 2021-05-06 NOTE — PATIENT INSTRUCTIONS
Start Aricept (Donepezil) 5 mg daily to see if it helps with your mental clarity    Tentatively planned follow up scheduled July 1st at 10:30AM in person; can change to telephone visit if need be    Plan to work on a Medical Alert cesar with your contact information as well as Giselle in case of an emergency    Some online resources for support as follows:  The 24-7 Alzheimer's Association Hotline is: 1-913.977.4678.  We are located in the [facility].  The Alzheimer's Association helps anyone with memory loss, even if they don't have Alzheimer's Disease.    Alzheimer's Association Local Chapter  [phone] (phone)  [address] [facility]. [address] [place] [id number]

## 2021-05-06 NOTE — Clinical Note
CARLEEN Fajardo, had a nice visit today re: dementia. She has follow up UA/UC pending but is asymptomatic as of the time of the visit. Thanks!

## 2021-05-06 NOTE — PROGRESS NOTES
"MEMORY EVALUATION CLINIC - INITIAL EVALUATION    HPI: Selena Jacobo is a 89yoF here today with her daughter Dimple (who is visiting from [place]) re: progressive short term memory loss at least 3 years possibly heightened by isolation of the COVID-19 pandemic. Also some word/name finding difficulty and difficulty with planning/organization. She lives in an independent senior living apartment, [facility]. Having more difficulty remembering to eat with some associated weight loss, has difficulty remembering the year, her age and per PCP notes sometimes mentions people that are not there.     Dimple perceives especially the past 3 months since her last visit to [place] that her mother has had further decline especially with short term memory loss with occasional delusions. Selena also perceives \"its all gone down\". Thinks she has lived at [facility] a year but daughter corrects her that its been since 2017. Selena says she goes on a walk most days and doesn't get lost. Doesn't have great friends there. Misses her family and admits to depression now not yet ready to start medication. Daughter Dimple lives in [place] and visits several times a year as able and her son Andrea lives in [place] but rarely visits. Selena had another daughter Nia that lived in [place] but unfortunately passed away d/t brain cancer in 2017. Dimple says her brother has POA and is placing a \"hard stop\" on having Selena move to [place] to be with Dimple as a power move. eSlena says today she'd very much want to be in [place] but historically sometimes has voiced wanting to stay in [place] as fear of a big move and the unknown is there. For now they've decided as a family to try to ultimately keep her at [facility] as long as its reasonably safe to do so. She says that she sleeps well. She tells me now that she doesn't recall the delusions that she has reported to Dimple in the past. Rarely has the initiative to call her daughter but can do so " "physically; Dimple does call Selena daily at the very least.     The delusions Dimple has heard her mother describe aren't often but when they do occur are of people she knows (ex: brother,  ) or rarely of \"kids\". In the past had one her brother was in bed but then that was able to be redirected. Recently had a delusion for about 2 days of \"someone is going to bring me a baby\" that was hard to redirect but ultimately was able to do so. Doesn't seem terribly distressed by these delusions when they do occur. Dimple asks good questions re: communication strategies when her mother has delusions and/or repetitive thoughts/questions.      Of note had recent UTI so came in for UA/UC yesterday. Culture in process. Currently no UTI symptoms however.      Social Hx (Employment/Schooling): Originally from [place], [place] raised on a farm and completed schooling there. Was  (now  in ) and a homemaker and had three children: Nia (sadly passed away in 2017 d/t brain cancer), Dimple (lives in [place]) and Andrea (lives in [facility])  PMH: Reviewed. Most notable for: Osteoporosis  Is there a h/o delirium, CVA/TIA, TBI, substances, parkinsonism?: None  Family Hx: Mother with h/o cognitive impairment   Advanced directive: Not on file    ADLs: Independent  Driving: Retired from driving in 2017  Finances: Son helps with investments and Dimple day-to-day finances; checkbook removed in March d/t Selena sending repeat checks for example to [employer] that she didn't remember  Shopping/housekeeping/meal prep: Dinner provided at [facility]; groceries ordered by daughter through [employer] but hard to remember to eat the healthy snacks; eats well when daughter visits  Medications: Vitamins in pill box self set up and reminders from Dimple over the phone  Home situation: Independent senior apartment at [facility]  Support: Dimple provides the majority of the caregiver role, son Andrea lives in [facility] and is reportedly POA, " "has 2 granddaughters in their 30s living in [place] but are able to help minimally  Behaviors/Hallucinations/Delusions: Yes, delusions periodically as noted above    OBJECTIVE:  /63 (BP Location: Right arm, Cuff Size: Adult Regular)   Pulse 62   Temp 97.9  F (36.6  C) (Oral)   Ht 1.588 m (5' 2.5\")   Wt 42.1 kg (92 lb 14.4 oz)   SpO2 98%   BMI 16.72 kg/m    Alert, nicely groomed, NAD  Soft spoken, hypophonic speech  No tremor  Normal rapid alternating movements  No pronator drift  Walks without an assist device  Mood euthymic, doesn't get upset/defensive/worried with discussion today  Thought she lived currently in [place] still  MoCA 10/30 (2, 1, 2, 1, 0, 1, 0, 1, 0, 1 + 1). Multi-domain impairment.      INVESTIGATIONS: Normal TSH and B12 March 2021, no head imaging on file nor any they can recall in the distant past    PRIOR TESTING: None    COGNITIVE MEDICATIONS: Starting Donepezil trial today      ASSESSMENT/PLAN: Mrs. Selena Jacobo is a 89yo left handed female with slowly progressive cognitive and functional impairment consistent with a diagnosis of dementia. We discussed this diagnosis today; they were both open to the discussion though its uncertain how much Selena was truly able to absorb which is to be expected. Discussed likely of the Alzheimer's subtype but can do future head imaging to clarify for any other pathology. It was helpful for Dimple to have this diagnosis to start planning more for the future and to further explain the changes she has seen progress in her mother. Future occupational therapy CPT could be helpful for guidance on level of cares support (option for [place] to do this but mostly Dimple wants to keep this more private for the time being which is ok). Both are agreeable to trying Donepezil to see if its helpful especially for calming her periodic delusions. Caregiver support needed and resources provided. Further long-term management and support during the course of this " progressive illness. My biggest immediate concern is risk of getting lost on her walks if she chooses to go on them as [facility] isn't monitoring this; Dimple sees this as a risk too and will work on getting a medical alert bracelet or other ID for safety in case of emergencies. They are partnering together as much as they can on IADLs from afar. Dimple ultimately would love to have her mom move to an assisted in [place] near to her but it sounds like brother Andrea is POA and not in favor of this per report today. Selena says she'd be willing to move to [place] but both acknowledge it would be a big move so for now willing to stay at [facility]. Suspect will need future more add-on services added. No UTI symptoms currently but has f/u UA/UC pending d/t last UTI.    Patient Instructions   Start Aricept (Donepezil) 5 mg daily to see if it helps with your mental clarity    Tentatively planned follow up scheduled July 1st at 10:30AM in person; can change to telephone visit if need be    Plan to work on a Medical Alert bracelet with your contact information as well as Dimple's in case of an emergency    Some online resources for support as follows:  The 24-7 Alzheimer's Association Hotline is: 1-843.611.5511.  We are located in the [facility].  The Alzheimer's Association helps anyone with memory loss, even if they don't have Alzheimer's Disease.    Alzheimer's Association Local Chapter  [phone] (phone)  [address] [facility]. [address] [place] [id number]            Thank you for this consultation!      110 minutes spent on the date of the encounter doing chart review, history and exam, MoCA testing, counseling re: future planning, discussion re: new medication Donepezil,  documentation and further activities as noted above      Asiya Garzon, [facility]

## 2021-05-07 NOTE — RESULT ENCOUNTER NOTE
Selena,    Your urine culture was negative.  It looks like you had a nice visit with Dr. Duran Garcia PA-C

## 2021-07-01 ENCOUNTER — OFFICE VISIT (OUTPATIENT)
Dept: FAMILY MEDICINE | Facility: CLINIC | Age: 86
End: 2021-07-01
Payer: COMMERCIAL

## 2021-07-01 VITALS
BODY MASS INDEX: 16.9 KG/M2 | TEMPERATURE: 98.2 F | SYSTOLIC BLOOD PRESSURE: 122 MMHG | DIASTOLIC BLOOD PRESSURE: 62 MMHG | OXYGEN SATURATION: 99 % | HEART RATE: 58 BPM | WEIGHT: 93.9 LBS

## 2021-07-01 DIAGNOSIS — F03.90 DEMENTIA WITHOUT BEHAVIORAL DISTURBANCE, UNSPECIFIED DEMENTIA TYPE: Primary | ICD-10-CM

## 2021-07-01 DIAGNOSIS — R41.3 MEMORY LOSS: ICD-10-CM

## 2021-07-01 PROCEDURE — 99215 OFFICE O/P EST HI 40 MIN: CPT | Performed by: INTERNAL MEDICINE

## 2021-07-01 NOTE — PROGRESS NOTES
"MEMORY EVALUATION CLINIC - FOLLOWUP    INTERVAL HISTORY: Selena is here today in follow up accompanied by her daughter Dimple (visiting from [place]). Last seen two months ago with diagnosis of dementia with known slowly progressive cognitive changes and occasional delusions. Family partnering on IADLs though live out of state. Selena lives in an independent senior apartment at [facility]. We started Donepezil at our visit two months ago and talked about medical alert bracelet for safety as Selena goes out on her own walks.    Today, Selena brings up no concerns herself. She probably overdid the Donepezil initially by mistake as they ended up 6 pills short and had self limited GI symptoms. Since the c/o symptoms and knowing Selena gets days mixed up, Dipmle inferred her mom took too much Donepezil by mistake so they hired someone from [facility] to help with once daily medication administration and has since been feeling just fine without any GI symptoms. At baseline doesn't eat well though weight is stable. Has access to meal program but is often taking the food back to her room. In regards to medication effect, Selena doesn't perceive the benefit but Dimple feels her mom seems \"a little more on top of it and has less goofy comments on the phone\". Still asks questions about  mom or sister; can be redirected. Still seems to go out walking daily but not getting lost though also no one directly overseeing that she goes out. They will likely be getting a medical alert bracelet soon. Adult children helping with finances. Dimple asks great questions about progression and medication dose adjustments to consider.      OBJECTIVE:  /62 (BP Location: Right arm, Patient Position: Sitting, Cuff Size: Adult Small)   Pulse 58   Temp 98.2  F (36.8  C) (Temporal)   Wt 42.6 kg (93 lb 14.4 oz)   SpO2 99%   Breastfeeding No   BMI 16.90 kg/m    Alert, pleasant, NAD, nicely groomed  Thin body frame  Weight " stable however  Normal speech, no tremor  Sweet disposition  Doesn't add much to conversation even when directly asked  Walks without assist device      INVESTIGATIONS: Normal TSH and B12 March 2021, no head imaging on file nor any they can recall in the distant past and its ordered today       PRIOR TESTING: MoCA 10/30 May 2021      COGNITIVE MEDICATIONS: Donepezil 5 mg daily started May 2021        ASSESSMENT/PLAN: Mrs. Selena Jacobo is a 89yo left handed female with slowly progressive cognitive and functional impairment consistent with a diagnosis of dementia. Selena is now tolerating 5 mg Donepezil daily with subtle improvement and its administered with help of [facility] staff. Still has some delusions though redirectable. Future head imaging can help r/o other pathologies though likely Selena has dementia of the Alzheimer's subtype. Dimple asked great questions which were answered and we reviewed together patient instructions below in regards to follow up plan.      Patient Instructions   1) Continue Donepezil (Aricept) 5 mg daily for now    2) As nearing refill, Dimple to send me a [employer] message with thoughts about either staying on the 5 mg dose or trial of 10 mg daily    3) Future head CT scan approximately Sept 2021 with results to come to you on [facility]    4) Follow up about 6-12 months or as needed based on questions/concerns  Call to schedule your follow up appointment: # [phone]  [facility]  [address]  [address]  [place] [address]          57 minutes spent on the date of the encounter doing chart review, history and exam, counseling, documentation and further activities as noted above      Asiya Garzon DO  [facility]

## 2021-07-01 NOTE — PATIENT INSTRUCTIONS
1) Continue Donepezil (Aricept) 5 mg daily for now    2) As nearing refill, Dimple to send me a [place] message with thoughts about either staying on the 5 mg dose or trial of 10 mg daily    3) Future head CT scan approximately Sept 2021 with results to come to you on MyChart    4) Follow up about 6-12 months or as needed based on questions/concerns  Call to schedule your follow up appointment: # [phone]  [facility]  [address]  [address]  [place] [address]

## 2021-09-04 ENCOUNTER — HEALTH MAINTENANCE LETTER (OUTPATIENT)
Age: 86
End: 2021-09-04

## 2021-09-08 ENCOUNTER — MYC MEDICAL ADVICE (OUTPATIENT)
Dept: FAMILY MEDICINE | Facility: CLINIC | Age: 86
End: 2021-09-08

## 2021-09-08 DIAGNOSIS — F03.90 DEMENTIA WITHOUT BEHAVIORAL DISTURBANCE, UNSPECIFIED DEMENTIA TYPE: ICD-10-CM

## 2021-09-09 RX ORDER — DONEPEZIL HYDROCHLORIDE 5 MG/1
5 TABLET, FILM COATED ORAL DAILY
Qty: 60 TABLET | Refills: 5 | Status: SHIPPED | OUTPATIENT
Start: 2021-09-09 | End: 2022-03-30 | Stop reason: DRUGHIGH

## 2021-09-09 NOTE — TELEPHONE ENCOUNTER
Dr Garzon see below, pt requesting a refill on Donepezil - pended    Also questioning the need for a CT scan    Please advise     Ivonne VILLATORO RN

## 2021-09-16 ENCOUNTER — OFFICE VISIT (OUTPATIENT)
Dept: PODIATRY | Facility: CLINIC | Age: 86
End: 2021-09-16
Payer: COMMERCIAL

## 2021-09-16 ENCOUNTER — LAB (OUTPATIENT)
Dept: LAB | Facility: CLINIC | Age: 86
End: 2021-09-16
Payer: COMMERCIAL

## 2021-09-16 VITALS
WEIGHT: 93 LBS | HEIGHT: 63 IN | SYSTOLIC BLOOD PRESSURE: 100 MMHG | BODY MASS INDEX: 16.48 KG/M2 | DIASTOLIC BLOOD PRESSURE: 62 MMHG

## 2021-09-16 DIAGNOSIS — L60.3 DYSTROPHIC NAIL: Primary | ICD-10-CM

## 2021-09-16 DIAGNOSIS — M81.0 AGE-RELATED OSTEOPOROSIS WITHOUT CURRENT PATHOLOGICAL FRACTURE: ICD-10-CM

## 2021-09-16 PROCEDURE — 82310 ASSAY OF CALCIUM: CPT

## 2021-09-16 PROCEDURE — 36415 COLL VENOUS BLD VENIPUNCTURE: CPT

## 2021-09-16 PROCEDURE — G0127 TRIM NAIL(S): HCPCS | Performed by: PODIATRIST

## 2021-09-16 PROCEDURE — 99203 OFFICE O/P NEW LOW 30 MIN: CPT | Mod: 25 | Performed by: PODIATRIST

## 2021-09-16 ASSESSMENT — MIFFLIN-ST. JEOR: SCORE: 798.04

## 2021-09-16 NOTE — PATIENT INSTRUCTIONS
Thank you for choosing [facility] Podiatry / Foot & Ankle Surgery!    DR CASTILLO'S CLINIC LOCATIONS  [facility]   [address] [facility] [address]   [place] [address] [place] [address] [phone]       SET UP SURGERY: [phone]    APPOINTMENTS: [phone]    BILLING QUESTIONS: [phone]    FAX NUMBER: [phone]        ROUTINE FOOT CARE (NAIL TRIMMING / CALLUSES)    Go to afcna.org (American Foot Care Nurses Association) and search for providers near you.  Otherwise, this is a list we have gathered of  recommended locations/providers in [place].    [facility]   [phone]   [facility]  [phone]  [email or web address]   [facility]  [phone]  [facility] + [place] mile radius [facility]  [phone]  [facility]   Katie Esparza, DPM  [address] [place] [address]  [phone] Alex Ribeiro, DPM  [address] [place] [address] [phone]   [facility]  [phone] [address] [place] [address]  [facility]  Dr. Alex Leon  [phone]  [place], [place]   [facility]  [phone]  [place] [facility] [place] Locations  (does not take BCBS) FYI:  *Some providers accept insurance while others are out of pocket. Please contact them for details*

## 2021-09-16 NOTE — LETTER
"    9/16/2021         RE: Selena Jacobo  [address] [place] [address]        Dear Colleague,    Thank you for referring your patient, Selena Jacobo, to the [facility] PODIATRY. Please see a copy of my visit note below.    Foot & Ankle Surgery  September 16, 2021    CC: Problematic hallux nail bilateral    I was asked to see Selena Jacobo regarding the chief complaint by: Self    HPI:  Pt is a 89 year old female who presents with above complaint.  Chronic bilateral hallux nail issues.  I saw her in 2015 for an ingrown nail on the left great toe.  The right hallux nail has been problematic for her and so she pulled this off herself recently.  The left hallux nail has been problematic, thick, and rubbing on the adjacent second toe.    ROS:   Pos for CC.  The patient denies current nausea, vomiting, chills, fevers, belly pain, calf pain, chest pain or SOB.  Complete remainder of ROS is otherwise neg.    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    L retrograde uterteogram, balloon dilation       SXHX:    Past Surgical History:   Procedure Laterality Date     CATARACT IOL, RT/LT          MEDS:    Current Outpatient Medications   Medication     Ascorbic Acid (VITAMIN C PO)     Calcium-Phosphorus-Vitamin D (CITRACAL CALCIUM GUMMIES) 250-115-250 MG-MG-UNIT CHEW     denosumab (PROLIA) 60 MG/ML SOSY injection     donepezil (ARICEPT) 5 MG tablet     Multiple Vitamins-Minerals (CENTRUM SILVER) per tablet     Multiple Vitamins-Minerals (PRESERVISION AREDS) TABS     Nutritional Supplements (BOOST HIGH PROTEIN) LIQD     Nutritional Supplements (ENSURE ACTIVE HIGH PROTEIN) LIQD     Current Facility-Administered " Medications   Medication     denosumab (PROLIA) injection 60 mg       ALL:     Allergies   Allergen Reactions     Fosamax [Alendronate Sodium]      Penicillins Other (See Comments)     fever       FMH:    Family History   Problem Relation Age of Onset     Alzheimer Disease Mother      Brain Cancer Daughter          age 65       SocHx:    Social History     Socioeconomic History     Marital status:      Spouse name: Not on file     Number of children: Not on file     Years of education: Not on file     Highest education level: Not on file   Occupational History     Not on file   Tobacco Use     Smoking status: Never Smoker     Smokeless tobacco: Never Used   Substance and Sexual Activity     Alcohol use: Yes     Alcohol/week: 0.0 standard drinks     Comment: rare glass of wine  maybe a holiday / special occassion      Drug use: No     Sexual activity: Not Currently   Other Topics Concern     Parent/sibling w/ CABG, MI or angioplasty before 65F 55M? Not Asked   Social History Narrative    , 3 kids, 5 grandkids, 4 great kids     Social Determinants of Health     Financial Resource Strain:      Difficulty of Paying Living Expenses:    Food Insecurity:      Worried About Running Out of Food in the Last Year:      Ran Out of Food in the Last Year:    Transportation Needs:      Lack of Transportation (Medical):      Lack of Transportation (Non-Medical):    Physical Activity:      Days of Exercise per Week:      Minutes of Exercise per Session:    Stress:      Feeling of Stress :    Social Connections:      Frequency of Communication with Friends and Family:      Frequency of Social Gatherings with Friends and Family:      Attends Sab[employer] Services:      Active Member of Clubs or Organizations:      Attends Club or Organization Meetings:      Marital Status:    Intimate Partner Violence:      Fear of Current or Ex-Partner:      Emotionally Abused:      Physically Abused:      Sexually Abused:             EXAMINATION:  Gen:   No apparent distress  Neuro:   A&Ox3, no deficits  Psych:    Answering questions appropriately for age and situation with normal affect  Head:    NCAT  Eye:    Visual scanning without deficit  Ear:    Response to auditory stimuli wnl  Lung:    Non-labored breathing on RA noted  Abd:    NTND per patient report  Lymph:    Neg for pitting/non-pitting edema BLE  Vasc:    Pulses palpable, CFT minimally delayed  Neuro:    Light touch sensation intact to all sensory nerve distributions without paresthesias  Derm:   Right hallux -nailbed presents with healthy tissue without local inflammation or signs of infection or necrosis.  Left hallux -thickened, dystrophic, incurvated at the lateral nail fold as well as abutting the left second toe without second toe wound  MSK:    ROM, strength wnl without limitation, no pain on palpation noted.  Calf:    Neg for redness, swelling or tenderness    Assessment:  89 year old female with dystrophic painful left hallux nail; previous self removal of the right hallux nail with healthy nail bed      Plan:  Discussed etiologies, anatomy and options  1.  Dystrophic painful left hallux nail  -I personally reviewed and interpreted the patient's lower extremity history pertinent to today's visit, including imaging/labs, in preparation for initiating a treatment program.  -The nail was debrided in length and thickness without incident.  -Routine nail care handout dispensed for future routine nail care options  -Multiple bilateral nails were debrided today as they were thickened/dystrophic and incurvated into the skin  -We did discuss the option for avulsion of the left hallux nail, including temporary versus permanent options.  She will proceed with routine nail care for now    2.  Recent self removal of right hallux nail with healthy nail bed  -Daily wound care: Wash thoroughly, dry thoroughly, antibiotic ointment (bacitracin versus Neosporin) with a Band-Aid until  healed  -No indication for p.o. antibiotics today    Follow up: As needed or sooner with acute issues      Patient's medical history was reviewed today      David Cuellar DPM [facility]  Podiatric Foot & Ankle Surgeon  [facility]  [phone]    Disclaimer: This note consists of symbols derived from keyboarding, dictation and/or voice recognition software. As a result, there may be errors in the script that have gone undetected. Please consider this when interpreting information found in this chart.            Again, thank you for allowing me to participate in the care of your patient.        Sincerely,        David Cuellar DPM, MILAN

## 2021-09-16 NOTE — PROGRESS NOTES
"Foot & Ankle Surgery  September 16, 2021    CC: Problematic hallux nail bilateral    I was asked to see Selena BRIGHT Donnell regarding the chief complaint by: Self    HPI:  Pt is a 89 year old female who presents with above complaint.  Chronic bilateral hallux nail issues.  I saw her in 2015 for an ingrown nail on the left great toe.  The right hallux nail has been problematic for her and so she pulled this off herself recently.  The left hallux nail has been problematic, thick, and rubbing on the adjacent second toe.    ROS:   Pos for CC.  The patient denies current nausea, vomiting, chills, fevers, belly pain, calf pain, chest pain or SOB.  Complete remainder of ROS is otherwise neg.    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    L retrograde uterteogram, balloon dilation       SXHX:    Past Surgical History:   Procedure Laterality Date     CATARACT IOL, RT/LT          MEDS:    Current Outpatient Medications   Medication     Ascorbic Acid (VITAMIN C PO)     Calcium-Phosphorus-Vitamin D (CITRACAL CALCIUM GUMMIES) 250-115-250 MG-MG-UNIT CHEW     denosumab (PROLIA) 60 MG/ML SOSY injection     donepezil (ARICEPT) 5 MG tablet     Multiple Vitamins-Minerals (CENTRUM SILVER) per tablet     Multiple Vitamins-Minerals (PRESERVISION AREDS) TABS     Nutritional Supplements (BOOST HIGH PROTEIN) LIQD     Nutritional Supplements (ENSURE ACTIVE HIGH PROTEIN) LIQD     Current Facility-Administered Medications   Medication     denosumab (PROLIA) injection 60 mg       ALL:     Allergies   Allergen Reactions     Fosamax [Alendronate Sodium]      Penicillins Other (See Comments)     fever       FMH:    Family History   Problem Relation Age of Onset "     Alzheimer Disease Mother      Brain Cancer Daughter          age 65       SocHx:    Social History     Socioeconomic History     Marital status:      Spouse name: Not on file     Number of children: Not on file     Years of education: Not on file     Highest education level: Not on file   Occupational History     Not on file   Tobacco Use     Smoking status: Never Smoker     Smokeless tobacco: Never Used   Substance and Sexual Activity     Alcohol use: Yes     Alcohol/week: 0.0 standard drinks     Comment: rare glass of wine  maybe a holiday / special occassion      Drug use: No     Sexual activity: Not Currently   Other Topics Concern     Parent/sibling w/ CABG, MI or angioplasty before 65F 55M? Not Asked   Social History Narrative    , 3 kids, 5 grandkids, 4 great kids     Social Determinants of Health     Financial Resource Strain:      Difficulty of Paying Living Expenses:    Food Insecurity:      Worried About Running Out of Food in the Last Year:      Ran Out of Food in the Last Year:    Transportation Needs:      Lack of Transportation (Medical):      Lack of Transportation (Non-Medical):    Physical Activity:      Days of Exercise per Week:      Minutes of Exercise per Session:    Stress:      Feeling of Stress :    Social Connections:      Frequency of Communication with Friends and Family:      Frequency of Social Gatherings with Friends and Family:      Attends Caodaism Services:      Active Member of Clubs or Organizations:      Attends Club or Organization Meetings:      Marital Status:    Intimate Partner Violence:      Fear of Current or Ex-Partner:      Emotionally Abused:      Physically Abused:      Sexually Abused:            EXAMINATION:  Gen:   No apparent distress  Neuro:   A&Ox3, no deficits  Psych:    Answering questions appropriately for age and situation with normal affect  Head:    NCAT  Eye:    Visual scanning without deficit  Ear:    Response to auditory stimuli  wnl  Lung:    Non-labored breathing on RA noted  Abd:    NTND per patient report  Lymph:    Neg for pitting/non-pitting edema BLE  Vasc:    Pulses palpable, CFT minimally delayed  Neuro:    Light touch sensation intact to all sensory nerve distributions without paresthesias  Derm:   Right hallux -nailbed presents with healthy tissue without local inflammation or signs of infection or necrosis.  Left hallux -thickened, dystrophic, incurvated at the lateral nail fold as well as abutting the left second toe without second toe wound  MSK:    ROM, strength wnl without limitation, no pain on palpation noted.  Calf:    Neg for redness, swelling or tenderness    Assessment:  89 year old female with dystrophic painful left hallux nail; previous self removal of the right hallux nail with healthy nail bed      Plan:  Discussed etiologies, anatomy and options  1.  Dystrophic painful left hallux nail  -I personally reviewed and interpreted the patient's lower extremity history pertinent to today's visit, including imaging/labs, in preparation for initiating a treatment program.  -The nail was debrided in length and thickness without incident.  -Routine nail care handout dispensed for future routine nail care options  -Multiple bilateral nails were debrided today as they were thickened/dystrophic and incurvated into the skin  -We did discuss the option for avulsion of the left hallux nail, including temporary versus permanent options.  She will proceed with routine nail care for now    2.  Recent self removal of right hallux nail with healthy nail bed  -Daily wound care: Wash thoroughly, dry thoroughly, antibiotic ointment (bacitracin versus Neosporin) with a Band-Aid until healed  -No indication for p.o. antibiotics today    Follow up: As needed or sooner with acute issues      Patient's medical history was reviewed today      David Cuellar DPM FACFAS FACFAOM  Podiatric Foot & Ankle Surgeon  [facility]  [phone]    Disclaimer: This note consists of symbols derived from keyboarding, dictation and/or voice recognition software. As a result, there may be errors in the script that have gone undetected. Please consider this when interpreting information found in this chart.

## 2021-09-18 LAB — CALCIUM SERPL-MCNC: 9.5 MG/DL (ref 8.5–10.1)

## 2021-09-20 ENCOUNTER — ALLIED HEALTH/NURSE VISIT (OUTPATIENT)
Dept: NURSING | Facility: CLINIC | Age: 86
End: 2021-09-20
Payer: COMMERCIAL

## 2021-09-20 DIAGNOSIS — M81.0 AGE-RELATED OSTEOPOROSIS WITHOUT CURRENT PATHOLOGICAL FRACTURE: Primary | ICD-10-CM

## 2021-09-20 PROCEDURE — 96372 THER/PROPH/DIAG INJ SC/IM: CPT | Performed by: PHYSICIAN ASSISTANT

## 2021-09-20 PROCEDURE — 99207 PR NO CHARGE NURSE ONLY: CPT

## 2021-09-20 NOTE — PROGRESS NOTES
Clinic Administered Medication Documentation    Administrations This Visit     denosumab (PROLIA) injection 60 mg     Admin Date  09/20/2021 Action  Given Dose  60 mg Route  Subcutaneous Site  Left Arm Administered By  Niki George RN    Ordering Provider: Tana Garcia PA-C    Patient Supplied?: No                  Prolia Documentation    Prior to injection, verified patient identity using patient's name and date of birth. Medication was administered. Please see MAR and medication order for additional information. Patient instructed to remain in clinic for 15 minutes and report any adverse reaction to staff immediately .    Indication: Prolia  (denosumab) is a prescription medicine used to treat osteoporosis in patients who:     Are at high risk for fracture, meaning patients who have had a fracture related to osteoporosis, or who have multiple risk factors for fracture.    Cannot use another osteoporosis medicine or other osteoporosis medicines did not work well.    The timeline for early/late injections would be 4 weeks early and any time after the 6 month steve. If a patient receives their injection late, then the subsequent injection would be 6 months from the date that they actually received the injection.    When was the last injection?  3/18/2021  Was the last injection at least 6 months ago? Yes  Has the prior authorization been completed?  Yes  Is there an active order (within the past 365 days) in the chart?  Yes  Patient denied having dental work involving the bone in the past 6 months?  Yes  Patient denies a plan to dental work involving the bone in the next 6 months? Yes    The following steps were completed to comply with the REMS program for Prolia:    Reviewed information in the Medication Guide and Patient Counseling Chart, including the serious risks of Prolia  and the symptoms of each risk.    Advised patient to seek prompt medical attention if they have signs or symptoms of any of the  serious risks.    Provided each patient a copy of the Medication Guide and Patient Brochure.      Was entire vial of medication used? Yes  Vial/Syringe: Syringe  Expiration Date:  7/1/2023  Was the medication not being billed by clinic? No     Niki George RN

## 2021-11-15 ENCOUNTER — MYC MEDICAL ADVICE (OUTPATIENT)
Dept: FAMILY MEDICINE | Facility: CLINIC | Age: 86
End: 2021-11-15
Payer: COMMERCIAL

## 2021-11-16 NOTE — TELEPHONE ENCOUNTER
Dr Garzon see below     Pt's family asking about June scheduling availability     Please advise     Ivonne VILLATORO, Triage RN  [facility]

## 2022-02-09 ENCOUNTER — TELEPHONE (OUTPATIENT)
Dept: FAMILY MEDICINE | Facility: CLINIC | Age: 87
End: 2022-02-09
Payer: COMMERCIAL

## 2022-02-09 NOTE — TELEPHONE ENCOUNTER
Updated dx on med     Ivonne VILLATORO, Triage RN  [facility]

## 2022-02-09 NOTE — TELEPHONE ENCOUNTER
----- Message from Tana Garcia PA-C sent at 2/9/2022  3:13 PM CST -----  Regarding: FW: Bella  Ivonne,    Can you attach this dx for her prolia. Whatever was used last year is fine.    Tana Garcia PA-C  ----- Message -----  From: Cathi Hernandes  Sent: 2/9/2022   1:19 PM CST  To: Tana Garcia PA-C, Ivonne Wong RN  Subject: Artia                                           Hello,     This patient is scheduled for prolia.  With their insurance coverage, it follows Medicare's NGS policy.  Currently we have the DX M81.0 but this request will need an additional diagnosis to support why this patient is not appropriate for other therapies.  Her previous order had a secondary DX of Z92.29.  Are you able to add this as a secondary DX on the order?    Thank you,    Cathi Hernandes

## 2022-03-15 ENCOUNTER — LAB (OUTPATIENT)
Dept: LAB | Facility: CLINIC | Age: 87
End: 2022-03-15
Payer: COMMERCIAL

## 2022-03-15 DIAGNOSIS — M81.0 AGE-RELATED OSTEOPOROSIS WITHOUT CURRENT PATHOLOGICAL FRACTURE: ICD-10-CM

## 2022-03-15 DIAGNOSIS — Z92.29 HISTORY OF BISPHOSPHONATE THERAPY: ICD-10-CM

## 2022-03-15 PROCEDURE — 36415 COLL VENOUS BLD VENIPUNCTURE: CPT

## 2022-03-15 PROCEDURE — 80053 COMPREHEN METABOLIC PANEL: CPT

## 2022-03-16 LAB
ALBUMIN SERPL-MCNC: 3.5 G/DL (ref 3.4–5)
ALP SERPL-CCNC: 73 U/L (ref 40–150)
ALT SERPL W P-5'-P-CCNC: 23 U/L (ref 0–50)
ANION GAP SERPL CALCULATED.3IONS-SCNC: 4 MMOL/L (ref 3–14)
AST SERPL W P-5'-P-CCNC: 22 U/L (ref 0–45)
BILIRUB SERPL-MCNC: 0.4 MG/DL (ref 0.2–1.3)
BUN SERPL-MCNC: 34 MG/DL (ref 7–30)
CALCIUM SERPL-MCNC: 9.9 MG/DL (ref 8.5–10.1)
CALCIUM SERPL-MCNC: 9.9 MG/DL (ref 8.5–10.1)
CHLORIDE BLD-SCNC: 107 MMOL/L (ref 94–109)
CO2 SERPL-SCNC: 31 MMOL/L (ref 20–32)
CREAT SERPL-MCNC: 0.74 MG/DL (ref 0.52–1.04)
GFR SERPL CREATININE-BSD FRML MDRD: 75 ML/MIN/1.73M2
GLUCOSE BLD-MCNC: 138 MG/DL (ref 70–99)
POTASSIUM BLD-SCNC: 4.3 MMOL/L (ref 3.4–5.3)
PROT SERPL-MCNC: 7.4 G/DL (ref 6.8–8.8)
SODIUM SERPL-SCNC: 142 MMOL/L (ref 133–144)

## 2022-03-16 NOTE — PROGRESS NOTES
"SUBJECTIVE:   Selena Jacobo is a 89 year old female who presents for Preventive Visit.    Pt has osteoporosis and is on Prolia (scheduled to get this tomorrow)  Pt has dementia and sees Dr. Garzon  Pt lives at [facility], her dtr convinced her to start an exercise class  They offer foot care there  She has thickened toenails.  She also enjoys walking outside.  She eats alone in her room   Immun up to date.  Has dtr (Dimple) who lives in [place]. She is here for the next 5 days.  She is concerned about increasing the dose of aricept due to pts low weight  Pt has hx of UTIs  Pts dementia is worse  Wt is low, but stable, drinks Ensure  She has eye exam tomorrow.        Patient has been advised of split billing requirements and indicates understanding: Yes  Are you in the first 12 months of your Medicare coverage?  No    Healthy Habits:     In general, how would you rate your overall health?  Good    Frequency of exercise:  2-3 days/week    Duration of exercise:  Other    Do you usually eat at least 4 servings of fruit and vegetables a day, include whole grains    & fiber and avoid regularly eating high fat or \"junk\" foods?  No    Taking medications regularly:  Yes    Barriers to taking medications:  None    Medication side effects:  None    Ability to successfully perform activities of daily living:  Medication administration requires assistance    Home Safety:  No safety concerns identified    Hearing Impairment:  Need to ask people to speak up or repeat themselves    In the past 6 months, have you been bothered by leaking of urine?  No    In general, how would you rate your overall mental or emotional health?  Fair      PHQ-2 Total Score: 2    Additional concerns today:  Yes    Do you feel safe in your environment? Yes    Have you ever done Advance Care Planning? (For example, a Health Directive, POLST, or a discussion with a medical provider or your loved ones about your wishes): Yes, advance care planning is " on file.       Fall risk  Fallen 2 or more times in the past year?: No  Any fall with injury in the past year?: No    Cognitive Screening   1) Repeat 3 items (Leader, Season, Table)   2) Clock draw: ABNORMAL wrongwrong time  3) 3 item recall: Recalls NO objects   Results: 0 items recalled: PROBABLE COGNITIVE IMPAIRMENT, **INFORM PROVIDER**    Mini-CogTM Copyright ROSANA Echevarria. Licensed by the author for use in [facility]; reprinted with permission ([email or web address]). All rights reserved.      Do you have sleep apnea, excessive snoring or daytime drowsiness?: no    Reviewed and updated as needed this visit by clinical staff   Tobacco  Allergies  Meds   Med Hx            Reviewed and updated as needed this visit by Provider       Med Hx           Social History     Tobacco Use     Smoking status: Never Smoker     Smokeless tobacco: Never Used   Substance Use Topics     Alcohol use: Yes     Alcohol/week: 0.0 standard drinks     Comment: rare glass of wine  maybe a holiday / special occassion      If you drink alcohol do you typically have >3 drinks per day or >7 drinks per week? No    Alcohol Use 3/17/2022   Prescreen: >3 drinks/day or >7 drinks/week? No   Prescreen: >3 drinks/day or >7 drinks/week? -   No flowsheet data found.            Current providers sharing in care for this patient include:   Patient Care Team:  Tana Garcia PA-C as PCP - General (Internal Medicine)  David Cuellar DPM as Assigned Musculoskeletal Provider  Tana Garcia PA-C as Assigned PCP    The following health maintenance items are reviewed in Epic and correct as of today:  Health Maintenance Due   Topic Date Due     ANNUAL REVIEW OF  ORDERS  Never done         Past Medical History:   Diagnosis Date     Dementia (H)      Generalized OA      GERD (gastroesophageal reflux disease)      Hyperlipidemia      Osteoporosis      Rotator cuff tear      UPJ stricture, 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    L retrograde uterteogram, balloon  "dilation     Past Surgical History:   Procedure Laterality Date     CATARACT IOL, RT/LT       Current Outpatient Medications   Medication Sig Dispense Refill     Ascorbic Acid (VITAMIN C PO) Take by mouth daily       Calcium-Phosphorus-Vitamin D (CITRACAL CALCIUM GUMMIES) 250-115-250 MG-MG-UNIT CHEW        denosumab (PROLIA) 60 MG/ML SOSY injection Inject 1 mL (60 mg) Subcutaneous every 6 months 1 mL 0     donepezil (ARICEPT) 5 MG tablet Take 1 tablet (5 mg) by mouth daily 60 tablet 5     Multiple Vitamins-Minerals (CENTRUM SILVER) per tablet Take 1 tablet by mouth daily       Multiple Vitamins-Minerals (PRESERVISION AREDS) TABS Take by mouth daily       Nutritional Supplements (BOOST HIGH PROTEIN) LIQD        Nutritional Supplements (ENSURE ACTIVE HIGH PROTEIN) LIQD          Review of Systems   Constitutional: Negative for chills and fever.   HENT: Negative for congestion, ear pain, hearing loss and sore throat.    Eyes: Negative for pain and visual disturbance.   Respiratory: Negative for cough and shortness of breath.    Cardiovascular: Negative for chest pain, palpitations and peripheral edema.   Gastrointestinal: Negative for abdominal pain, constipation, diarrhea, heartburn, hematochezia and nausea.   Breasts:  Negative for tenderness, breast mass and discharge.   Genitourinary: Negative for dysuria, frequency, genital sores, hematuria, pelvic pain, urgency, vaginal bleeding and vaginal discharge.   Musculoskeletal: Negative for arthralgias, joint swelling and myalgias.   Skin: Negative for rash.   Neurological: Negative for dizziness, weakness, headaches and paresthesias.   Psychiatric/Behavioral: Negative for mood changes. The patient is not nervous/anxious.        OBJECTIVE:   /64 (BP Location: Right arm, Patient Position: Chair, Cuff Size: Adult Small)   Pulse 60   Temp 97.2  F (36.2  C) (Temporal)   Resp 20   Ht 1.588 m (5' 2.5\")   Wt 45.4 kg (100 lb)   SpO2 98%   BMI 18.00 kg/m   Estimated " "body mass index is 18 kg/m  as calculated from the following:    Height as of this encounter: 1.588 m (5' 2.5\").    Weight as of this encounter: 45.4 kg (100 lb).  Physical Exam  GENERAL APPEARANCE: thin, frail, dtr provides most of the history  EYES: Eyes grossly normal to inspection, PERRL and conjunctivae and sclerae normal  HENT: ear canals and TM's normal, nose and mouth without ulcers or lesions, oropharynx clear and oral mucous membranes moist  NECK: no adenopathy, no asymmetry, masses, or scars and thyroid normal to palpation  RESP: lungs clear to auscultation - no rales, rhonchi or wheezes  CV: regular rate and rhythm, normal S1 S2, no S3 or S4, no murmur, click or rub, no peripheral edema and peripheral pulses strong  ABDOMEN: soft, nontender, no hepatosplenomegaly, no masses and bowel sounds normal  MS: no musculoskeletal defects are noted and gait is age appropriate without ataxia  SKIN: no suspicious lesions or rashes  NEURO: Normal strength and tone, sensory exam grossly normal, mentation intact and speech normal  PSYCH: mentation appears normal and affect normal/obvious dementia    Results for orders placed or performed in visit on 03/17/22   UA Macro with Reflex to Micro and Culture - lab collect     Status: Abnormal    Specimen: Urine, Clean Catch   Result Value Ref Range    Color Urine Yellow Colorless, Straw, Light Yellow, Yellow    Appearance Urine Cloudy (A) Clear    Glucose Urine Negative Negative mg/dL    Bilirubin Urine Negative Negative    Ketones Urine Negative Negative mg/dL    Specific Gravity Urine 1.025 1.003 - 1.035    Blood Urine Trace (A) Negative    pH Urine 7.0 5.0 - 7.0    Protein Albumin Urine 30  (A) Negative mg/dL    Urobilinogen Urine 0.2 0.2, 1.0 E.U./dL    Nitrite Urine Negative Negative    Leukocyte Esterase Urine Trace (A) Negative   Urine Microscopic     Status: Abnormal   Result Value Ref Range    Bacteria Urine Moderate (A) None Seen /HPF    RBC Urine 0-2 0-2 /HPF /HPF " "   WBC Urine 0-5 0-5 /HPF /HPF    Amorphous Crystals Urine Few (A) None Seen /HPF    Narrative    Urine Culture not indicated           ASSESSMENT / PLAN:   Assessment and Plan:     (Z00.00) Medicare annual wellness visit, subsequent  (primary encounter diagnosis)  Comment:   Plan: previsit CMP reviewed with pt and her dtr.  Immun up to date.    (Z87.261) Personal history of urinary tract infection  Comment:   Plan: UA Macro with Reflex to Micro and Culture - lab        collect, Urine Microscopic            (F03.90) Dementia without behavioral disturbance, unspecified dementia type (H)  Comment:   Plan: okay to increase aricept from 5 to 10mg while dtr Dimple in town. Go back to lower dose of pt develops any GI SE    (M81.0) Age-related osteoporosis without current pathological fracture  Comment:   Plan: scheduled for prolia tomorrow.        Patient has been advised of split billing requirements and indicates understanding: Yes    COUNSELING:  Reviewed preventive health counseling, as reflected in patient instructions    Estimated body mass index is 18 kg/m  as calculated from the following:    Height as of this encounter: 1.588 m (5' 2.5\").    Weight as of this encounter: 45.4 kg (100 lb).        She reports that she has never smoked. She has never used smokeless tobacco.      Appropriate preventive services were discussed with this patient, including applicable screening as appropriate for cardiovascular disease, diabetes, osteopenia/osteoporosis, and glaucoma.  As appropriate for age/gender, discussed screening for colorectal cancer, prostate cancer, breast cancer, and cervical cancer. Checklist reviewing preventive services available has been given to the patient.    Reviewed patients plan of care and provided an AVS. The Basic Care Plan (routine screening as documented in Health Maintenance) for Selena meets the Care Plan requirement. This Care Plan has been established and reviewed with the Patient and " daughter.    Counseling Resources:  ATP IV Guidelines  Pooled Cohorts Equation Calculator  Breast Cancer Risk Calculator  Breast Cancer: Medication to Reduce Risk  FRAX Risk Assessment  ICSI Preventive Guidelines  Dietary Guidelines for Americans, 2010  USDA's MyPlate  ASA Prophylaxis  Lung CA Screening    DEBORAH Gomez [facility]    Identified Health Risks:

## 2022-03-17 ENCOUNTER — OFFICE VISIT (OUTPATIENT)
Dept: FAMILY MEDICINE | Facility: CLINIC | Age: 87
End: 2022-03-17
Payer: COMMERCIAL

## 2022-03-17 VITALS
DIASTOLIC BLOOD PRESSURE: 64 MMHG | HEART RATE: 60 BPM | WEIGHT: 100 LBS | TEMPERATURE: 97.2 F | OXYGEN SATURATION: 98 % | HEIGHT: 63 IN | RESPIRATION RATE: 20 BRPM | BODY MASS INDEX: 17.72 KG/M2 | SYSTOLIC BLOOD PRESSURE: 133 MMHG

## 2022-03-17 DIAGNOSIS — Z87.440 PERSONAL HISTORY OF URINARY TRACT INFECTION: ICD-10-CM

## 2022-03-17 DIAGNOSIS — M81.0 AGE-RELATED OSTEOPOROSIS WITHOUT CURRENT PATHOLOGICAL FRACTURE: ICD-10-CM

## 2022-03-17 DIAGNOSIS — F03.90 DEMENTIA WITHOUT BEHAVIORAL DISTURBANCE, UNSPECIFIED DEMENTIA TYPE: ICD-10-CM

## 2022-03-17 DIAGNOSIS — Z00.00 MEDICARE ANNUAL WELLNESS VISIT, SUBSEQUENT: Primary | ICD-10-CM

## 2022-03-17 LAB
ALBUMIN UR-MCNC: 30 MG/DL
AMORPH CRY #/AREA URNS HPF: ABNORMAL /HPF
APPEARANCE UR: ABNORMAL
BACTERIA #/AREA URNS HPF: ABNORMAL /HPF
BILIRUB UR QL STRIP: NEGATIVE
COLOR UR AUTO: YELLOW
GLUCOSE UR STRIP-MCNC: NEGATIVE MG/DL
HGB UR QL STRIP: ABNORMAL
KETONES UR STRIP-MCNC: NEGATIVE MG/DL
LEUKOCYTE ESTERASE UR QL STRIP: ABNORMAL
NITRATE UR QL: NEGATIVE
PH UR STRIP: 7 [PH] (ref 5–7)
RBC #/AREA URNS AUTO: ABNORMAL /HPF
SP GR UR STRIP: 1.02 (ref 1–1.03)
UROBILINOGEN UR STRIP-ACNC: 0.2 E.U./DL
WBC #/AREA URNS AUTO: ABNORMAL /HPF

## 2022-03-17 PROCEDURE — 99397 PER PM REEVAL EST PAT 65+ YR: CPT | Performed by: PHYSICIAN ASSISTANT

## 2022-03-17 PROCEDURE — 81001 URINALYSIS AUTO W/SCOPE: CPT | Performed by: PHYSICIAN ASSISTANT

## 2022-03-17 ASSESSMENT — ENCOUNTER SYMPTOMS
DIARRHEA: 0
DYSURIA: 0
ABDOMINAL PAIN: 0
HEADACHES: 0
PALPITATIONS: 0
MYALGIAS: 0
NAUSEA: 0
HEARTBURN: 0
BREAST MASS: 0
CHILLS: 0
FEVER: 0
CONSTIPATION: 0
HEMATURIA: 0
PARESTHESIAS: 0
JOINT SWELLING: 0
EYE PAIN: 0
COUGH: 0
WEAKNESS: 0
NERVOUS/ANXIOUS: 0
DIZZINESS: 0
SHORTNESS OF BREATH: 0
SORE THROAT: 0
HEMATOCHEZIA: 0
FREQUENCY: 0
ARTHRALGIAS: 0

## 2022-03-17 ASSESSMENT — ACTIVITIES OF DAILY LIVING (ADL): CURRENT_FUNCTION: MEDICATION ADMINISTRATION REQUIRES ASSISTANCE

## 2022-03-17 ASSESSMENT — PAIN SCALES - GENERAL: PAINLEVEL: NO PAIN (0)

## 2022-03-18 ENCOUNTER — ALLIED HEALTH/NURSE VISIT (OUTPATIENT)
Dept: NURSING | Facility: CLINIC | Age: 87
End: 2022-03-18
Payer: COMMERCIAL

## 2022-03-18 DIAGNOSIS — M81.0 AGE-RELATED OSTEOPOROSIS WITHOUT CURRENT PATHOLOGICAL FRACTURE: Primary | ICD-10-CM

## 2022-03-18 DIAGNOSIS — Z92.29 HISTORY OF BISPHOSPHONATE THERAPY: ICD-10-CM

## 2022-03-18 PROCEDURE — 96372 THER/PROPH/DIAG INJ SC/IM: CPT | Performed by: PHYSICIAN ASSISTANT

## 2022-03-18 PROCEDURE — 99207 PR NO CHARGE NURSE ONLY: CPT

## 2022-03-18 NOTE — PROGRESS NOTES
Clinic Administered Medication Documentation    Administrations This Visit     denosumab (PROLIA) injection 60 mg     Admin Date  03/18/2022 Action  Given Dose  60 mg Route  Subcutaneous Site  Abdominal Tissue Administered By  Ivonne Wong RN    Ordering Provider: Tana Garcia PA-C    Patient Supplied?: No    Comments: pt did not have enough subcutaneous tissue on arm to administer on arm                  Prolia Documentation    Prior to injection, verified patient identity using patient's name and date of birth. Medication was administered. Please see MAR and medication order for additional information. Patient instructed to remain in clinic for 15 minutes and report any adverse reaction to staff immediately .    Indication: Prolia  (denosumab) is a prescription medicine used to treat osteoporosis in patients who:     Are at high risk for fracture, meaning patients who have had a fracture related to osteoporosis, or who have multiple risk factors for fracture.    Cannot use another osteoporosis medicine or other osteoporosis medicines did not work well.    The timeline for early/late injections would be 4 weeks early and any time after the 6 month steve. If a patient receives their injection late, then the subsequent injection would be 6 months from the date that they actually received the injection.    When was the last injection?  9/20/22   Was the last injection at least 6 months ago? Yes  Has the prior authorization been completed?  Yes  Is there an active order (within the past 365 days) in the chart?  Yes  Patient denied having dental work involving the bone in the past 6 months?  Yes  Patient denies a plan to dental work involving the bone in the next 6 months? Yes    The following steps were completed to comply with the REMS program for Prolia:    Reviewed information in the Medication Guide and Patient Counseling Chart, including the serious risks of Prolia  and the symptoms of each risk.    Advised  patient to seek prompt medical attention if they have signs or symptoms of any of the serious risks.    Provided each patient a copy of the Medication Guide and Patient Brochure.      Was entire vial of medication used? Yes  Vial/Syringe: Syringe  Expiration Date:  07/2024  Was the medication not being billed by clinic? No     Pt had a small skin tear on right elbow area. Pt does not remember scraping. Applied bacitracin and bandage and advised pt monitor this closely.     Ivonne Wong RN on 3/18/2022 at 10:44 AM

## 2022-03-23 ENCOUNTER — MYC MEDICAL ADVICE (OUTPATIENT)
Dept: FAMILY MEDICINE | Facility: CLINIC | Age: 87
End: 2022-03-23
Payer: COMMERCIAL

## 2022-03-28 ENCOUNTER — MYC MEDICAL ADVICE (OUTPATIENT)
Dept: FAMILY MEDICINE | Facility: CLINIC | Age: 87
End: 2022-03-28
Payer: COMMERCIAL

## 2022-03-28 DIAGNOSIS — F03.90 DEMENTIA WITHOUT BEHAVIORAL DISTURBANCE, UNSPECIFIED DEMENTIA TYPE: Primary | ICD-10-CM

## 2022-03-28 NOTE — LETTER
March 30, 2022      Selena Jacobo  [address] [place] [address]            To Whom it may concern,     This letter gives permission to [facility] staff to fill patient's pill container.         Sincerely,      Tana Garcia PA-C

## 2022-03-29 ENCOUNTER — MEDICAL CORRESPONDENCE (OUTPATIENT)
Dept: HEALTH INFORMATION MANAGEMENT | Facility: CLINIC | Age: 87
End: 2022-03-29
Payer: COMMERCIAL

## 2022-03-30 RX ORDER — DONEPEZIL HYDROCHLORIDE 10 MG/1
10 TABLET, FILM COATED ORAL AT BEDTIME
Qty: 90 TABLET | Refills: 1 | Status: SHIPPED | OUTPATIENT
Start: 2022-03-30 | End: 2022-04-26

## 2022-03-30 NOTE — TELEPHONE ENCOUNTER
Med list and letter printed and placed on provider desk in [facility].    Titus Monroe RN  [facility]

## 2022-03-30 NOTE — TELEPHONE ENCOUNTER
Please see patient's mychart:    Pended letter, please review and route back to triage to fax updated med list and letter to facility    Please reply back to patient, route to triage follow up, or route to team coordinators to have patient schedule an appointment.     Titus Monroe RN  [facility]

## 2022-04-12 ENCOUNTER — MEDICAL CORRESPONDENCE (OUTPATIENT)
Dept: HEALTH INFORMATION MANAGEMENT | Facility: CLINIC | Age: 87
End: 2022-04-12
Payer: COMMERCIAL

## 2022-04-14 ENCOUNTER — MEDICAL CORRESPONDENCE (OUTPATIENT)
Dept: HEALTH INFORMATION MANAGEMENT | Facility: CLINIC | Age: 87
End: 2022-04-14
Payer: COMMERCIAL

## 2022-04-15 ENCOUNTER — TELEPHONE (OUTPATIENT)
Dept: FAMILY MEDICINE | Facility: CLINIC | Age: 87
End: 2022-04-15
Payer: COMMERCIAL

## 2022-04-15 NOTE — TELEPHONE ENCOUNTER
Reason for Call:  Form, our goal is to have forms completed with 72 hours, however, some forms may require a visit or additional information.    Type of letter, form or note:  [facility] Admission Order Form    Who is the form from?: [facility] (if other please explain)    Where did the form come from: form was faxed in    What clinic location was the form placed at?: [facility]    Where the form was placed: Given to physician    What number is listed as a contact on the form?: [phone]       Additional comments: Form placed on Tana Garcia's desk    Call taken on 4/15/2022 at 2:40 PM by Stephanie Dudley

## 2022-04-18 NOTE — TELEPHONE ENCOUNTER
Raul from [facility] called, saying they did not get the fax.  Please resend to the corrected fax number.    Corrected fax number is  [phone] Attn Raul George RN

## 2022-04-18 NOTE — TELEPHONE ENCOUNTER
Forms faxed again today 04/18/2022 [phone] Attn Raul Alex ,[facility]

## 2022-04-21 ENCOUNTER — MEDICAL CORRESPONDENCE (OUTPATIENT)
Dept: HEALTH INFORMATION MANAGEMENT | Facility: CLINIC | Age: 87
End: 2022-04-21
Payer: COMMERCIAL

## 2022-04-25 ENCOUNTER — MYC MEDICAL ADVICE (OUTPATIENT)
Dept: FAMILY MEDICINE | Facility: CLINIC | Age: 87
End: 2022-04-25
Payer: COMMERCIAL

## 2022-04-25 DIAGNOSIS — F03.90 DEMENTIA WITHOUT BEHAVIORAL DISTURBANCE, UNSPECIFIED DEMENTIA TYPE: ICD-10-CM

## 2022-04-26 RX ORDER — DONEPEZIL HYDROCHLORIDE 5 MG/1
5 TABLET, FILM COATED ORAL DAILY
Qty: 90 TABLET | Refills: 3 | COMMUNITY
Start: 2022-04-26 | End: 2022-04-26

## 2022-04-26 RX ORDER — DONEPEZIL HYDROCHLORIDE 5 MG/1
5 TABLET, FILM COATED ORAL DAILY
Qty: 30 TABLET | Refills: 11 | Status: SHIPPED | OUTPATIENT
Start: 2022-04-26

## 2022-04-26 NOTE — TELEPHONE ENCOUNTER
Spoke with EUGENIA Redd and script for Donepezil change to 5 mg daily e-prescribed to [facility] which will then update their facility orders. No new medication list needed by them.    Asiya Garzon, DO

## 2022-04-28 NOTE — TELEPHONE ENCOUNTER
See FYI from patient.     Kathy Bradshaw, RN  [facility] RN Triage Team

## 2022-05-04 ENCOUNTER — MYC MEDICAL ADVICE (OUTPATIENT)
Dept: FAMILY MEDICINE | Facility: CLINIC | Age: 87
End: 2022-05-04
Payer: COMMERCIAL

## 2022-05-05 NOTE — TELEPHONE ENCOUNTER
Tana, please read the my chart message and advise.     Lenka Hampton RN  -[facility]

## 2022-05-19 ENCOUNTER — DOCUMENTATION ONLY (OUTPATIENT)
Dept: OTHER | Facility: CLINIC | Age: 87
End: 2022-05-19
Payer: COMMERCIAL

## 2022-05-24 ENCOUNTER — ASSISTED LIVING VISIT (OUTPATIENT)
Dept: GERIATRICS | Facility: CLINIC | Age: 87
End: 2022-05-24
Payer: COMMERCIAL

## 2022-05-24 VITALS
HEIGHT: 61 IN | SYSTOLIC BLOOD PRESSURE: 118 MMHG | WEIGHT: 97.8 LBS | RESPIRATION RATE: 18 BRPM | BODY MASS INDEX: 18.46 KG/M2 | DIASTOLIC BLOOD PRESSURE: 72 MMHG | OXYGEN SATURATION: 98 % | HEART RATE: 68 BPM | TEMPERATURE: 98.2 F

## 2022-05-24 DIAGNOSIS — Z71.89 ADVANCED DIRECTIVES, COUNSELING/DISCUSSION: ICD-10-CM

## 2022-05-24 DIAGNOSIS — M15.9 OSTEOARTHRITIS OF MULTIPLE JOINTS, UNSPECIFIED OSTEOARTHRITIS TYPE: ICD-10-CM

## 2022-05-24 DIAGNOSIS — G30.1 LATE ONSET ALZHEIMER'S DEMENTIA WITHOUT BEHAVIORAL DISTURBANCE (H): Primary | ICD-10-CM

## 2022-05-24 DIAGNOSIS — M81.0 AGE-RELATED OSTEOPOROSIS WITHOUT CURRENT PATHOLOGICAL FRACTURE: ICD-10-CM

## 2022-05-24 DIAGNOSIS — N39.0 FREQUENT UTI: ICD-10-CM

## 2022-05-24 DIAGNOSIS — K21.9 GASTROESOPHAGEAL REFLUX DISEASE WITHOUT ESOPHAGITIS: ICD-10-CM

## 2022-05-24 DIAGNOSIS — F02.80 LATE ONSET ALZHEIMER'S DEMENTIA WITHOUT BEHAVIORAL DISTURBANCE (H): Primary | ICD-10-CM

## 2022-05-24 NOTE — PROGRESS NOTES
[facility] GERIATRICS    PRIMARY CARE PROVIDER AND CLINIC:  Long Lino, APRN CNP, [address] [address]  Chief Complaint   Patient presents with     [facility] Medical Record Number:  [id number]  Place of Service where encounter took place:  [facility]) [[id number]]    Selena Jacobo  is a 89 year old  (1/11/1930), admitted to the above facility 4/20/2022 from [facility]  due to progressive dementia and the need for a higher level of care.   Information obtained from facility chart, facility staff, chart review, patient and her son Andrea Jacobo.     HPI:    Medical history significant for dementia, osteoporosis, recurrent UTI, GERD.   Followed by Dr Asiya Garzon in [facility], last seen 7/1/2021. Diagnosed with dementia, likely Alzheimer's type,  5/2021 and donepezil was started. MOCA 10/30. Family noticed cognitive changes for at least 3 yrs prior to diagnosis with short term memory loss and occasional delusions.     She is quiet, unable to provide history. Denies feeling ill. Denies pain. Ambulates without a device. Son is not aware of any falls. [place] with cares. Nurse reports no issues.     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  FAMILY HISTORY: family history includes Alzheimer Disease in her mother; Brain Cancer in her daughter.  SOCIAL HISTORY:    "reports that she has never smoked. She has never used smokeless tobacco. She reports current alcohol use. She reports that she does not use drugs.  Patient's living condition: lives in an assisted living facility. Son Andrea Jacobo lives in [place] and manages her finances. Daughter Dimple Gallardo lives in [place]. Oldest daughter  in 2017 of brain cancer. Granddaughter Robyn Suarez lives locally and is healthcare agent/emergency contact.  . Stopped driving in 2017.     Post Discharge Medication Reconciliation Status: discharge medications reconciled, continue medications without change  Current Outpatient Medications   Medication Sig     Ascorbic Acid (VITAMIN C PO) Take 500 mg by mouth daily     Calcium-Phosphorus-Vitamin D (CITRACAL CALCIUM GUMMIES) 250-115-250 MG-MG-UNIT CHEW 1 gummy daily     donepezil (ARICEPT) 5 MG tablet Take 1 tablet (5 mg) by mouth daily     Multiple Vitamins-Minerals (PRESERVISION AREDS) TABS Take by mouth daily     Nutritional Supplements (NUTRITIONAL SUPPLEMENT PO) Take 1 Units by mouth 2 times daily     Current Facility-Administered Medications   Medication     denosumab (PROLIA) injection 60 mg       ROS:  Unobtainable secondary to cognitive impairment.     Vitals:  /72   Pulse 68   Temp 98.2  F (36.8  C)   Resp 18   Ht 1.549 m (5' 1\")   Wt 44.4 kg (97 lb 12.8 oz)   SpO2 98%   BMI 18.48 kg/m    Exam:  GENERAL APPEARANCE:  Alert, in no distress, thin  ENT:  [place], oropharynx clear  EYES:  EOM normal, conjunctiva and lids normal  NECK:  no adenopathy, no thyromegaly  RESP:  lungs clear to auscultation , no respiratory distress  CV:  regular rate and rhythm, no murmur, no edema  ABDOMEN:  soft, non-tender, no distension, no masses  M/S:   gait steady. MORALES with good strength. No joint inflammation  SKIN:  no visible rashes or open areas  PSYCH:  oriented to self, son, insight and judgement impaired, memory impaired , flat affect    Lab/Diagnostic data:  Recent labs in " EPIC reviewed by me today.     ASSESSMENT / PLAN:  (G30.1,  F02.80) Late onset Alzheimer's dementia without behavioral disturbance (H)  (primary encounter diagnosis)  Comment: progressive disease with severe cognitive deficits. BIMS 0/15.  Affect somewhat flat. Appears to be adjusting to the unit. Weight is low, but stable.   Plan: continue donepezil for now. Memory Care staff assistance with cares, meals, activities, med admin. Monitor mood, sleep.     (M81.0) Age-related osteoporosis without current pathological fracture  Comment: DEXA 3/19/2021 showed marked osteoporosis in the lumbar spine, osteopenia both femoral neck   Remains ambulatory,no falls   Plan: continue calcium, vitamin D. Prolia every 6 months, last dose 3/18/2022.     (M15.9) Osteoarthritis of multiple joints, unspecified osteoarthritis type  Comment: appears comfortable  Plan: tylenol prn     (K21.9) Gastroesophageal reflux disease without esophagitis  Comment: per history, not on treatment   Plan: monitor     (N39.0) Frequent UTI  Comment: currently asymptomatic   Plan: monitor symptoms     (Z71.89) Advanced directives, counseling/discussion  Comment: she has a healthcare directive and son confirms DNR/DNI  Plan: epic orders updated         Total time spent with patient visit at the AL  facility was 45 min including patient visit, review of past records and discussion with son . Greater than 50% of total time spent with counseling and coordinating care due to establishing primary care. Counseling son re: progressive nature of dementia, medications and potential side effects, plan of care and advanced directive. Coordinating the following with facility staff: medication orders, plan of care, advanced directive.         Electronically signed by:  CHARLES Turcios CNP

## 2022-05-24 NOTE — LETTER
5/24/2022        RE: Selena Jacobo  [address] [place] [address]        [facility] GERIATRICS    PRIMARY CARE PROVIDER AND CLINIC:  CHARLES Turcios CNP, [address] [facility] [id number]  Chief Complaint   Patient presents with     [facility] Medical Record Number:  [id number]  Place of Service where encounter took place:  [facility]) [[id number]]    Selena Jacobo  is a 89 year old  (1/11/1930), admitted to the above facility 4/20/2022 from [facility]  due to progressive dementia and the need for a higher level of care.   Information obtained from facility chart, facility staff, chart review, patient and her son Andrea Jacobo.     HPI:    Medical history significant for dementia, osteoporosis, recurrent UTI, GERD.   Followed by Dr Asiya Garzon in Memory Clinic, last seen 7/1/2021. Diagnosed with dementia, likely Alzheimer's type,  5/2021 and donepezil was started. MOCA 10/30. Family noticed cognitive changes for at least 3 yrs prior to diagnosis with short term memory loss and occasional delusions.     She is quiet, unable to provide history. Denies feeling ill. Denies pain. Ambulates without a device. Son is not aware of any falls. [place] with cares. Nurse reports no issues.     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  FAMILY HISTORY: family history includes  "Alzheimer Disease in her mother; Brain Cancer in her daughter.  SOCIAL HISTORY:   reports that she has never smoked. She has never used smokeless tobacco. She reports current alcohol use. She reports that she does not use drugs.  Patient's living condition: lives in an assisted living facility. Son Andrea Jacobo lives in [place] and manages her finances. Daughter Dimple Gallardo lives in [place]. Oldest daughter  in 2017 of brain cancer. Granddaughter Robyn Suarez lives locally and is healthcare agent/emergency contact.  . Stopped driving in 2017.     Post Discharge Medication Reconciliation Status: discharge medications reconciled, continue medications without change  Current Outpatient Medications   Medication Sig     Ascorbic Acid (VITAMIN C PO) Take 500 mg by mouth daily     Calcium-Phosphorus-Vitamin D (CITRACAL CALCIUM GUMMIES) 250-115-250 MG-MG-UNIT CHEW 1 gummy daily     donepezil (ARICEPT) 5 MG tablet Take 1 tablet (5 mg) by mouth daily     Multiple Vitamins-Minerals (PRESERVISION AREDS) TABS Take by mouth daily     Nutritional Supplements (NUTRITIONAL SUPPLEMENT PO) Take 1 Units by mouth 2 times daily     Current Facility-Administered Medications   Medication     denosumab (PROLIA) injection 60 mg       ROS:  Unobtainable secondary to cognitive impairment.     Vitals:  /72   Pulse 68   Temp 98.2  F (36.8  C)   Resp 18   Ht 1.549 m (5' 1\")   Wt 44.4 kg (97 lb 12.8 oz)   SpO2 98%   BMI 18.48 kg/m    Exam:  GENERAL APPEARANCE:  Alert, in no distress, thin  ENT:  [place], oropharynx clear  EYES:  EOM normal, conjunctiva and lids normal  NECK:  no adenopathy, no thyromegaly  RESP:  lungs clear to auscultation , no respiratory distress  CV:  regular rate and rhythm, no murmur, no edema  ABDOMEN:  soft, non-tender, no distension, no masses  M/S:   gait steady. MORALES with good strength. No joint inflammation  SKIN:  no visible rashes or open areas  PSYCH:  oriented to self, son, insight and " judgement impaired, memory impaired , flat affect    Lab/Diagnostic data:  Recent labs in EPIC reviewed by me today.     ASSESSMENT / PLAN:  (G30.1,  F02.80) Late onset Alzheimer's dementia without behavioral disturbance (H)  (primary encounter diagnosis)  Comment: progressive disease with severe cognitive deficits. BIMS 0/15.  Affect somewhat flat. Appears to be adjusting to the unit. Weight is low, but stable.   Plan: continue donepezil for now. Memory Care staff assistance with cares, meals, activities, med admin. Monitor mood, sleep.     (M81.0) Age-related osteoporosis without current pathological fracture  Comment: DEXA 3/19/2021 showed marked osteoporosis in the lumbar spine, osteopenia both femoral neck   Remains ambulatory,no falls   Plan: continue calcium, vitamin D. Prolia every 6 months, last dose 3/18/2022.     (M15.9) Osteoarthritis of multiple joints, unspecified osteoarthritis type  Comment: appears comfortable  Plan: tylenol prn     (K21.9) Gastroesophageal reflux disease without esophagitis  Comment: per history, not on treatment   Plan: monitor     (N39.0) Frequent UTI  Comment: currently asymptomatic   Plan: monitor symptoms     (Z71.89) Advanced directives, counseling/discussion  Comment: she has a healthcare directive and son confirms DNR/DNI  Plan: epic orders updated         Total time spent with patient visit at the AL  facility was 45 min including patient visit, review of past records and discussion with son . Greater than 50% of total time spent with counseling and coordinating care due to establishing primary care. Counseling son re: progressive nature of dementia, medications and potential side effects, plan of care and advanced directive. Coordinating the following with facility staff: medication orders, plan of care, advanced directive.         Electronically signed by:  CHARLES Turcios CNP                       Sincerely,        CHARLES Turcios CNP

## 2022-05-27 ENCOUNTER — DOCUMENTATION ONLY (OUTPATIENT)
Dept: OTHER | Facility: CLINIC | Age: 87
End: 2022-05-27
Payer: COMMERCIAL

## 2022-06-06 VITALS
WEIGHT: 100 LBS | HEART RATE: 69 BPM | DIASTOLIC BLOOD PRESSURE: 53 MMHG | OXYGEN SATURATION: 96 % | RESPIRATION RATE: 16 BRPM | TEMPERATURE: 98 F | SYSTOLIC BLOOD PRESSURE: 111 MMHG | BODY MASS INDEX: 18.88 KG/M2 | HEIGHT: 61 IN

## 2022-06-07 ENCOUNTER — ASSISTED LIVING VISIT (OUTPATIENT)
Dept: GERIATRICS | Facility: CLINIC | Age: 87
End: 2022-06-07
Payer: COMMERCIAL

## 2022-06-07 DIAGNOSIS — Z53.9 ERRONEOUS ENCOUNTER--DISREGARD: Primary | ICD-10-CM

## 2022-06-07 DIAGNOSIS — E56.9 VITAMIN DEFICIENCY: Primary | ICD-10-CM

## 2022-06-07 RX ORDER — ASCORBIC ACID 500 MG
500 TABLET ORAL DAILY
Qty: 90 TABLET | Refills: 3 | Status: SHIPPED | OUTPATIENT
Start: 2022-06-07

## 2022-06-07 RX ORDER — MULTIVIT-MIN/FA/LYCOPEN/LUTEIN .4-300-25
1 TABLET ORAL DAILY
Qty: 90 TABLET | Refills: 3 | Status: SHIPPED | OUTPATIENT
Start: 2022-06-07

## 2022-06-07 NOTE — LETTER
6/7/2022        RE: Selena Jacobo  [facility]   [address] [place] [address]   [place] [address]        Pt out with family today and not available for planned visit.         Sincerely,        Karen Hayes MD

## 2022-06-27 ENCOUNTER — DOCUMENTATION ONLY (OUTPATIENT)
Dept: OTHER | Facility: CLINIC | Age: 87
End: 2022-06-27

## 2022-07-05 ENCOUNTER — ASSISTED LIVING VISIT (OUTPATIENT)
Dept: GERIATRICS | Facility: CLINIC | Age: 87
End: 2022-07-05
Payer: COMMERCIAL

## 2022-07-05 VITALS
DIASTOLIC BLOOD PRESSURE: 53 MMHG | RESPIRATION RATE: 16 BRPM | HEIGHT: 61 IN | OXYGEN SATURATION: 94 % | SYSTOLIC BLOOD PRESSURE: 111 MMHG | WEIGHT: 100 LBS | HEART RATE: 69 BPM | BODY MASS INDEX: 18.88 KG/M2 | TEMPERATURE: 97.6 F

## 2022-07-05 DIAGNOSIS — G30.1 LATE ONSET ALZHEIMER'S DEMENTIA WITHOUT BEHAVIORAL DISTURBANCE (H): ICD-10-CM

## 2022-07-05 DIAGNOSIS — M15.9 OSTEOARTHRITIS OF MULTIPLE JOINTS, UNSPECIFIED OSTEOARTHRITIS TYPE: ICD-10-CM

## 2022-07-05 DIAGNOSIS — F02.80 LATE ONSET ALZHEIMER'S DEMENTIA WITHOUT BEHAVIORAL DISTURBANCE (H): ICD-10-CM

## 2022-07-05 DIAGNOSIS — M81.0 AGE-RELATED OSTEOPOROSIS WITHOUT CURRENT PATHOLOGICAL FRACTURE: Primary | ICD-10-CM

## 2022-07-05 NOTE — LETTER
2022        RE: Selena Jacobo  [facility]  [address]  [place] [address]        Selena Jacobo is a 89 year old female seen 2022 at [facility] unit where she has resided for 3 months (admit 2022) seen for initial visit.   Pt is seen in her room up ambulatory without device.   Reports she was out for a walk this morning, but just getting ? Dressed /redressed now at lunchtime.    Agile, bends over to tie her shoes.   States she feels well, denies any current pain or other symptoms.    Not able to say where she was living prior to this AL.     By chart review, pt has had progressive memory loss over past 3 years, diagnosed with dementia by Dr Asiya Garzon in May 2021 when her MoCA was 10; donepezil started at that time.   Occasional delusions, and [place]   Her transition was fairly difficult, but she is now more settled. Didn't seem to tolerate the 10mg dose of donepezil, so decreased back to 5 mg/day         Past Medical History:   Diagnosis Date     Dementia (H)      Generalized OA      GERD (gastroesophageal reflux disease)      Hyperlipidemia      Osteoporosis      Rotator cuff tear      UPJ stricture, acquired 1999    L retrograde uterteogram, balloon dilation       Past Surgical History:   Procedure Laterality Date     CATARACT IOL, RT/LT       SH:  .   [place]ived [place] at [facility] past 5 years.   She has a son Andrea in [place], and daughter Dimple in [place].   One daughter  in 2017    Granddaughter Robyn lives in [place] and is HCA.     Non smoker, no EtOH    ROS:  MoCA 10/30 in    BIMS 0/15 at admission     Wt Readings from Last 5 Encounters:   22 45.4 kg (100 lb)   22 45.4 kg (100 lb)   22 44.4 kg (97 lb 12.8 oz)   22 45.4 kg (100 lb)   21 42.2 kg (93 lb)      EXAM:  NAD  /53   Pulse 69   Temp 97.6  F (36.4  C)    "Resp 16   Ht 1.549 m (5' 1\")   Wt 45.4 kg (100 lb)   SpO2 94%   BMI 18.89 kg/m     Neck supple without adenopathy  Lungs clear bilaterally with good air movement  Heart RRR s1s2  Abd soft, NT, no distention or guarding, +BS  Ext without edema  Neuro: very limited recall, speech okay, disorientation.   Ambulatory without device, steady gait   Psych: affect okay, pleasant.     Last Comprehensive Metabolic Panel:  Sodium   Date Value Ref Range Status   03/15/2022 142 133 - 144 mmol/L Final     Potassium   Date Value Ref Range Status   03/15/2022 4.3 3.4 - 5.3 mmol/L Final     Carbon Dioxide (CO2)   Date Value Ref Range Status   03/15/2022 31 20 - 32 mmol/L Final     Glucose   Date Value Ref Range Status   03/15/2022 138 (H) 70 - 99 mg/dL Final     Urea Nitrogen   Date Value Ref Range Status   03/15/2022 34 (H) 7 - 30 mg/dL Final     Creatinine   Date Value Ref Range Status   03/15/2022 0.74 0.52 - 1.04 mg/dL Final     GFR Estimate   Date Value Ref Range Status   03/15/2022 75 >60 mL/min/1.73m2 Final     Calcium   Date Value Ref Range Status   03/15/2022 9.9 8.5 - 10.1 mg/dL Final     Lab Results   Component Value Date    AST 22 03/15/2022      ALBUMIN 3.5 03/15/2022      ALKPHOS 73 03/15/2022      TSH   Date Value Ref Range Status   03/16/2021 3.12 0.40 - 4.00 mU/L Final        IMP/PLAN:       (M81.0) Age-related osteoporosis without current pathological fracture     Comment: by DEXA scan in 2021    Plan: vit D / calcium replacement   Receives Prolia injections in clinic, last one in March 2022 - so will be due in September      (G30.1,  F02.80) Late onset Alzheimer's dementia without behavioral disturbance (H)  Comment: gradual course with significant amnestic component, and now decline in functional status     Plan: AL Memory Care unit for assist with med admin, meals, activity and secure unit.     Continue donepezil 5 mg/day; monitor for known GI or cardiac side effects     (M15.9) Osteoarthritis of multiple " joints, unspecified osteoarthritis type  Comment: by history, no symptoms today    Plan: acetaminophen PRN     Karen Hayes MD         Sincerely,        Karen Hayes MD

## 2022-07-12 NOTE — PROGRESS NOTES
"Selena Jacobo is a 89 year old female seen 2022 at [facility] unit where she has resided for 3 months (admit 2022) seen for initial visit.   Pt is seen in her room up ambulatory without device.   Reports she was out for a walk this morning, but just getting ? Dressed /redressed now at lunchtime.    Agile, bends over to tie her shoes.   States she feels well, denies any current pain or other symptoms.    Not able to say where she was living prior to this AL.     By chart review, pt has had progressive memory loss over past 3 years, diagnosed with dementia by Dr Asiya Garzon in May 2021 when her MoCA was 10; donepezil started at that time.   Occasional delusions, and [place]   Her transition was fairly difficult, but she is now more settled. Didn't seem to tolerate the 10mg dose of donepezil, so decreased back to 5 mg/day         Past Medical History:   Diagnosis Date     Dementia (H)      Generalized OA      GERD (gastroesophageal reflux disease)      Hyperlipidemia      Osteoporosis      Rotator cuff tear      UPJ stricture, acquired 1999    L retrograde uterteogram, balloon dilation       Past Surgical History:   Procedure Laterality Date     CATARACT IOL, RT/LT       SH:  .   [place] [facility] past [facility] years.   She has a son Andrea in [place], and daughter Dimple in [place].   One daughter  in 2017    Granddaughter Robyn lives in [place] and is HCA.     Non smoker, no EtOH    ROS:  MoCA 10/30 in    BIMS 0/15 at admission     Wt Readings from Last 5 Encounters:   22 45.4 kg (100 lb)   22 45.4 kg (100 lb)   22 44.4 kg (97 lb 12.8 oz)   22 45.4 kg (100 lb)   21 42.2 kg (93 lb)      EXAM:  NAD  /53   Pulse 69   Temp 97.6  F (36.4  C)   Resp 16   Ht 1.549 m (5' 1\")   Wt 45.4 kg (100 lb)   SpO2 94%   BMI 18.89 kg/m     Neck supple without " adenopathy  Lungs clear bilaterally with good air movement  Heart RRR s1s2  Abd soft, NT, no distention or guarding, +BS  Ext without edema  Neuro: very limited recall, speech okay, disorientation.   Ambulatory without device, steady gait   Psych: affect okay, pleasant.     Last Comprehensive Metabolic Panel:  Sodium   Date Value Ref Range Status   03/15/2022 142 133 - 144 mmol/L Final     Potassium   Date Value Ref Range Status   03/15/2022 4.3 3.4 - 5.3 mmol/L Final     Carbon Dioxide (CO2)   Date Value Ref Range Status   03/15/2022 31 20 - 32 mmol/L Final     Glucose   Date Value Ref Range Status   03/15/2022 138 (H) 70 - 99 mg/dL Final     Urea Nitrogen   Date Value Ref Range Status   03/15/2022 34 (H) 7 - 30 mg/dL Final     Creatinine   Date Value Ref Range Status   03/15/2022 0.74 0.52 - 1.04 mg/dL Final     GFR Estimate   Date Value Ref Range Status   03/15/2022 75 >60 mL/min/1.73m2 Final     Calcium   Date Value Ref Range Status   03/15/2022 9.9 8.5 - 10.1 mg/dL Final     Lab Results   Component Value Date    AST 22 03/15/2022      ALBUMIN 3.5 03/15/2022      ALKPHOS 73 03/15/2022      TSH   Date Value Ref Range Status   03/16/2021 3.12 0.40 - 4.00 mU/L Final        IMP/PLAN:       (M81.0) Age-related osteoporosis without current pathological fracture     Comment: by DEXA scan in 2021    Plan: vit D / calcium replacement   Receives Prolia injections in clinic, last one in March 2022 - so will be due in September      (G30.1,  F02.80) Late onset Alzheimer's dementia without behavioral disturbance (H)  Comment: gradual course with significant amnestic component, and now decline in functional status     Plan: AL Memory Care unit for assist with med admin, meals, activity and secure unit.     Continue donepezil 5 mg/day; monitor for known GI or cardiac side effects     (M15.9) Osteoarthritis of multiple joints, unspecified osteoarthritis type  Comment: by history, no symptoms today    Plan: acetaminophen PRN      Karen Hayes MD

## 2022-08-02 DIAGNOSIS — E56.9 VITAMIN DEFICIENCY: Primary | ICD-10-CM

## 2022-08-02 RX ORDER — ANTIOX #8/OM3/DHA/EPA/LUT/ZEAX 250-2.5 MG
1 CAPSULE ORAL DAILY
Qty: 30 CAPSULE | Refills: 11 | Status: SHIPPED | OUTPATIENT
Start: 2022-08-02

## 2022-08-19 ENCOUNTER — ASSISTED LIVING VISIT (OUTPATIENT)
Dept: GERIATRICS | Facility: CLINIC | Age: 87
End: 2022-08-19
Payer: COMMERCIAL

## 2022-08-19 VITALS
BODY MASS INDEX: 19.07 KG/M2 | OXYGEN SATURATION: 96 % | TEMPERATURE: 97.8 F | HEIGHT: 61 IN | HEART RATE: 61 BPM | RESPIRATION RATE: 18 BRPM | DIASTOLIC BLOOD PRESSURE: 50 MMHG | SYSTOLIC BLOOD PRESSURE: 100 MMHG | WEIGHT: 101 LBS

## 2022-08-19 DIAGNOSIS — F02.80 LATE ONSET ALZHEIMER'S DEMENTIA WITHOUT BEHAVIORAL DISTURBANCE (H): ICD-10-CM

## 2022-08-19 DIAGNOSIS — E56.9 VITAMIN DEFICIENCY: ICD-10-CM

## 2022-08-19 DIAGNOSIS — G30.1 LATE ONSET ALZHEIMER'S DEMENTIA WITHOUT BEHAVIORAL DISTURBANCE (H): ICD-10-CM

## 2022-08-19 DIAGNOSIS — U07.1 INFECTION DUE TO 2019 NOVEL CORONAVIRUS: Primary | ICD-10-CM

## 2022-08-19 DIAGNOSIS — M81.0 AGE-RELATED OSTEOPOROSIS WITHOUT CURRENT PATHOLOGICAL FRACTURE: ICD-10-CM

## 2022-08-19 DIAGNOSIS — M15.9 OSTEOARTHRITIS OF MULTIPLE JOINTS, UNSPECIFIED OSTEOARTHRITIS TYPE: ICD-10-CM

## 2022-08-19 NOTE — LETTER
"    8/19/2022        RE: Selena Jacobo  [facility]  [address]  [place] [address]        [facility] GERIATRICS    Chief Complaint   Patient presents with     RECHECK     HPI:  Selena Jacobo is a 89 year old  (1/11/1930), who is being seen today for an episodic care visit at: [facility] [[id number]].   [place] for a higher level of care due to progressive dementia.   Medical history significant for Alzheimer's dementia diagnosed 5/2021, osteoporosis, GERD, osteoarthritis, hyperlipidemia.     Today's concern is:      Infection due to 2019 novel coronavirus  Late onset Alzheimer's dementia without behavioral disturbance (H)  Osteoarthritis of multiple joints, unspecified osteoarthritis type  Age-related osteoporosis without current pathological fracture  Vitamin deficiency   She is seen today after testing positive for COVID-19 on 8/16/2022. She is unable to provide history. Pleasant and talkative. Denies feeling ill. Good appetite. Denies pain. Afebrile, no hypoxia. Ambulates without a device. No falls. Independent with toileting and dressing. Receives assistance with bathing.       Allergies, and PMH/PSH reviewed in EPIC today    REVIEW OF SYSTEMS:  Unable to obtain due to dementia. Positives as noted under HPI     Objective:   /50   Pulse 61   Temp 97.8  F (36.6  C)   Resp 18   Ht 1.549 m (5' 1\")   Wt 45.8 kg (101 lb)   SpO2 96%   BMI 19.08 kg/m    GENERAL APPEARANCE:  Alert, in no distress, thin  ENT:  [place], oropharynx clear  EYES: conjunctiva and lids normal  NECK:  no adenopathy, no thyromegaly  RESP:  lungs clear to auscultation   CV:  regular rate and rhythm, no murmur, no edema  ABDOMEN:  soft, non-tender, no distension, no masses  M/S:   resting in bed.  MORALES with good strength. No joint inflammation  SKIN:  no visible rashes or open areas  PSYCH:  oriented to self,  insight and judgement impaired, memory " impaired, affect normal     Recent labs in [facility] reviewed by me today.     ASSESSMENT / PLAN:  (U07.1) Infection due to 2019 novel coronavirus  (primary encounter diagnosis)  Comment: tested positive 8/16/2022 and appears asymptomatic   Plan: monitor VS, O2 sats, symptoms     (G30.1,  F02.80) Late onset Alzheimer's dementia without behavioral disturbance (H)  Comment: severe deficits. BIMS 0/15. Desirable weight gain of 4 lbs   Plan: continue donepezil. Memory Care staff assistance with cares, meals, activities, med admin     (M15.9) Osteoarthritis of multiple joints, unspecified osteoarthritis type  Comment: appears comfortable   Plan: continue tylenol prn     (M81.0) Age-related osteoporosis without current pathological fracture   (E56.9) Vitamin deficiency  Comment: remains ambulatory, no falls  Plan: continue Prolia (due 9/2022), calcium, vitamin D         Electronically signed by: CHARLES Turcios CNP             Sincerely,        CHARLES Turcios CNP

## 2022-08-19 NOTE — PROGRESS NOTES
"[facility] GERIATRICS    Chief Complaint   Patient presents with     RECHECK     HPI:  Selena Jacobo is a 89 year old  (1/11/1930), who is being seen today for an episodic care visit at: [facility] ([facility]) [[id number]].   [place] a higher level of care due to progressive dementia.   Medical history significant for Alzheimer's dementia diagnosed 5/2021, osteoporosis, GERD, osteoarthritis, hyperlipidemia.     Today's concern is:      Infection due to 2019 novel coronavirus  Late onset Alzheimer's dementia without behavioral disturbance (H)  Osteoarthritis of multiple joints, unspecified osteoarthritis type  Age-related osteoporosis without current pathological fracture  Vitamin deficiency   She is seen today after testing positive for COVID-19 on 8/16/2022. She is unable to provide history. Pleasant and talkative. Denies feeling ill. Good appetite. Denies pain. Afebrile, no hypoxia. Ambulates without a device. No falls. Independent with toileting and dressing. Receives assistance with bathing.       Allergies, and PMH/PSH reviewed in EPIC today    REVIEW OF SYSTEMS:  Unable to obtain due to dementia. Positives as noted under HPI     Objective:   /50   Pulse 61   Temp 97.8  F (36.6  C)   Resp 18   Ht 1.549 m (5' 1\")   Wt 45.8 kg (101 lb)   SpO2 96%   BMI 19.08 kg/m    GENERAL APPEARANCE:  Alert, in no distress, thin  ENT:  [place]', oropharynx clear  EYES: conjunctiva and lids normal  NECK:  no adenopathy, no thyromegaly  RESP:  lungs clear to auscultation   CV:  regular rate and rhythm, no murmur, no edema  ABDOMEN:  soft, non-tender, no distension, no masses  M/S:   resting in bed.  MORALES with good strength. No joint inflammation  SKIN:  no visible rashes or open areas  PSYCH:  oriented to self,  insight and judgement impaired, memory impaired, affect normal     Recent labs in [employer] reviewed by me today.     ASSESSMENT / PLAN:  (U07.1) Infection due " to 2019 novel coronavirus  (primary encounter diagnosis)  Comment: tested positive 8/16/2022 and appears asymptomatic   Plan: monitor VS, O2 sats, symptoms     (G30.1,  F02.80) Late onset Alzheimer's dementia without behavioral disturbance (H)  Comment: severe deficits. BIMS 0/15. Desirable weight gain of 4 lbs   Plan: continue donepezil. Memory Care staff assistance with cares, meals, activities, med admin     (M15.9) Osteoarthritis of multiple joints, unspecified osteoarthritis type  Comment: appears comfortable   Plan: continue tylenol prn     (M81.0) Age-related osteoporosis without current pathological fracture   (E56.9) Vitamin deficiency  Comment: remains ambulatory, no falls  Plan: continue Prolia (due 9/2022), calcium, vitamin D         Electronically signed by: CHARLES Turcios CNP

## 2022-08-23 ENCOUNTER — TELEPHONE (OUTPATIENT)
Dept: GERIATRICS | Facility: CLINIC | Age: 87
End: 2022-08-23

## 2022-08-23 DIAGNOSIS — U07.1 INFECTION DUE TO 2019 NOVEL CORONAVIRUS: Primary | ICD-10-CM

## 2022-08-23 NOTE — TELEPHONE ENCOUNTER
Selena Jacobo   1930    Orders 2022:  1. Chloraseptic throat spray 1 spray to throat bid plus 1 spray every 4 hrs prn sore throat.   Sent to pharmacy   Discussed with daughter      [place] CHARLES Lino CNP on 2022 at 1:57 PM

## 2022-09-16 DIAGNOSIS — M81.0 AGE-RELATED OSTEOPOROSIS WITHOUT CURRENT PATHOLOGICAL FRACTURE: Primary | ICD-10-CM

## 2022-09-19 ENCOUNTER — MYC MEDICAL ADVICE (OUTPATIENT)
Dept: FAMILY MEDICINE | Facility: CLINIC | Age: 87
End: 2022-09-19

## 2022-09-19 ENCOUNTER — VIRTUAL VISIT (OUTPATIENT)
Dept: FAMILY MEDICINE | Facility: CLINIC | Age: 87
End: 2022-09-19
Payer: COMMERCIAL

## 2022-09-19 DIAGNOSIS — F03.90 DEMENTIA WITHOUT BEHAVIORAL DISTURBANCE, UNSPECIFIED DEMENTIA TYPE: Primary | ICD-10-CM

## 2022-09-19 PROCEDURE — 99443 PR PHYSICIAN TELEPHONE EVALUATION 21-30 MIN: CPT | Performed by: INTERNAL MEDICINE

## 2022-09-19 RX ORDER — DONEPEZIL HYDROCHLORIDE 5 MG/1
5 TABLET, FILM COATED ORAL DAILY
Qty: 10 TABLET | Refills: 0 | Status: SHIPPED | OUTPATIENT
Start: 2022-09-19 | End: 2022-09-19

## 2022-09-19 NOTE — PATIENT INSTRUCTIONS
Continue the mental/physical stimulation as possible for Selena    Continue the day-to-day hands on care / support she needs at this stage of dementia    Continue Donepezil 5 mg daily    Letter drafted to mail to Dimple re: status of Selena's dementia    Follow up with me as scheduled in December, but if medical needs are being met by the on site [facility] team, you could also cancel this appointment

## 2022-09-19 NOTE — LETTER
September 19, 2022        Selena Jacobo  [facility]  [address]  [place] [address]          To [facility]:    RE: Selena Jacobo has a diagnosis of dementia, likely of the Alzheimer's type, and currently resides in [facility]'s memory care unit. She is dependent upon others for meals, medication administration, showering and at times dressing assistance. Selena can benefit from as much mental and physical stimulation as possible to help slow the progression of the disease (ex: socialization and walking). However, as her dementia progresses, due to the natural unfortunate progression of the disease, she will need more hands-on physical care performed by others. Her daughter Dimple Gallardo has attended doctors appointments with Selena and would like it to be known that she'd be willing to provide that level of care for her mother at her home with 24-7 support in [place], [place].       Sincerely,        Asiya Garzon, DO

## 2022-09-19 NOTE — PROGRESS NOTES
"Selena is a 89 year old who is being evaluated via a billable video --> telephone visit.      How would you like to obtain your AVS? MyChart  Will anyone else be joining your visit? Yes, daughter Dimple    MEMORY EVALUATION CLINIC - FOLLOWUP    INTERVAL HISTORY: Selena Jacobo is a 89yoF with known diagnosis of dementia likely of the Alzheimer's type. Visit today started via [employer] video but due to technical difficulties we changed to telephone visit for the remainder. Visit completed at Selena's memory care facility, [facility], and accompanied by her daughter Dimple visiting from [place]. Dimple provides much of the history d/t Selena's advanced dementia though Selena answers questions when directly asked of her.    Selena has been on Donepezil since May 2021 and had subjective mild clinical impact per daughter Dimple on this medication, however, attempt to increase to 10 mg was not well tolerated and was almost like per Dimple's report, \"she went off the deep end\". Improved going back to the 5 mg dose which she remains on.     Selena transitioned from independent living apartments at [facility] to the memory care unit in April 2022 due to increased care needs with her advancing dementia. At first it was an understandably lisa transition but per my colleagues' notes that follow her on site, she is more settled now. Dimple says she visited her mom in April, June and now again in September and historically has visited often. Selena had COVID in August (among others on the memory care unit) with sore throat but otherwise fared ok.    Per Dimple, her mom's short term memory is \"gone\" but \"she gets along fine\". However, there isn't a lot of stimulation for her in the daily routine at her memory care unit. Selena has \"never been a \" to activities. She attends 3 meals per day but then seems to resort to going back to her room to rest/nap d/t lack of initiative on her own d/t her advanced dementia. However, when " "Dimple is in town they are on the go with errands, activities and walks. Dimple feels the longer she is in town, the more alert her mother is from all the stimulation. Dimple reports they pay for an added caregiver to come three times per week and walk with Selena. Besides meal plan, Selena has medications administered and showers 2x/week per staff. Dimple finds when she is visiting she needs to help her mom with dressing cues and changing out dirty underwear as well. Dimple asks to have a small quantity of Donepezil available through local pharmacy should they travel to visit Selena's sister in [place], [place].    Dimple very much wants to eventually move Selena to [place] to live with her and her  in their home. She recognizes the hands on assistance that her mother needs at this stage of the disease and wants to help keep her as active mentally/physically as possible as she ages and is upset that otherwise she spends much of her days napping. However, she is not legal POA so reports she is on a legal bridges with her brother in this regard. She requests a letter from me to the \"[facility] family\" simply stating the status of her mother's dementia and the anticipated care she will need in the future and asks that I mail it to her home address:  Dimple Gallardo  [address] [place] [address]    In speaking with Selena today she does cheerfully say she is glad Dimple is here in town visiting her.      OBJECTIVE:  Alert, sitting up, very nicely groomed and appears well cared for  Calm disposition  Listens throughout conversation  Says she is glad to have her daughter Dimple visiting from out of town      INVESTIGATIONS: Normal TSH and B12 March 2021; no head imaging on file and Selena+Dimple decided not to pursue this and I agree with them at this stage      PRIOR TESTING:  [facility] 10/30 in May 2021  BIMS 0/15 per [facility] memory care notes recently    COGNITIVE MEDICATIONS: Donepezil 5 mg daily (did not " tolerate dose increase to 10 mg) since May 2021        ASSESSMENT/PLAN: Selena Jacobo is a 89yoF with a diagnosis of dementia, likely of the Alzheimer's type, who now resides in memory care facility as of April 2022 with recent recorded BIMS 0/15. Per FAST staging, she is a stage 6. She is fully dependent on IADLs and also needs some assistance with ADLs (showering, dressing). Currently stable but per history above, not getting as much mental/physical stimulation as would be desired. Daughter Dimple has attended all previous visits with me and visits as often as she can from [place]. Eventually she'd like to move her mom to [place] with her and her , however, as noted above, she is not reportedly POA so is working on this through the legal system. Continue Donepezil as its possibly helped some; no reported negative s/e at the 5 mg dose. Continue support from [facility] on site primary care team of my colleagues CHARLES Marroquin CNP and Dr. Karen Hayes; discussed she may not need future f/u with me in memory clinic now that care can be provided on site. Letter drafted today to mail to Dimple simply stating the nature of Saritas disease and care anticipated in the future.      Patient Instructions   Continue the mental/physical stimulation as possible for Selena    Continue the day-to-day hands on care / support she needs at this stage of dementia    Continue Donepezil 5 mg daily    Letter drafted to mail to Dimple re: status of Saritas dementia    Follow up with me as scheduled in December, but if medical needs are being met by the on site [facility] team, you could also cancel this appointment        Video-Visit Details    Video Start Time: 10:40 AM    Type of service:  Video Visit (transitioned to telephone visit due to technical difficulties)    Video End Time: 11:10 AM    Originating Location (pt. Location): Other : [facility] Location  (provider location):  [facility]     Platform used for Video Visit: Diana Garzon DO  [facility] ODILON      78 minutes spent on the date of the encounter doing chart review, history and exam, counseling, letter drafting, documentation and further activities as noted above

## 2022-10-22 ENCOUNTER — HEALTH MAINTENANCE LETTER (OUTPATIENT)
Age: 87
End: 2022-10-22

## 2022-11-15 ENCOUNTER — ASSISTED LIVING VISIT (OUTPATIENT)
Dept: GERIATRICS | Facility: CLINIC | Age: 87
End: 2022-11-15
Payer: COMMERCIAL

## 2022-11-15 VITALS
TEMPERATURE: 97.7 F | SYSTOLIC BLOOD PRESSURE: 121 MMHG | HEART RATE: 80 BPM | HEIGHT: 61 IN | OXYGEN SATURATION: 97 % | DIASTOLIC BLOOD PRESSURE: 52 MMHG | BODY MASS INDEX: 20.2 KG/M2 | RESPIRATION RATE: 18 BRPM | WEIGHT: 107 LBS

## 2022-11-15 DIAGNOSIS — F02.80 LATE ONSET ALZHEIMER'S DEMENTIA WITHOUT BEHAVIORAL DISTURBANCE (H): Primary | ICD-10-CM

## 2022-11-15 DIAGNOSIS — M81.0 AGE-RELATED OSTEOPOROSIS WITHOUT CURRENT PATHOLOGICAL FRACTURE: ICD-10-CM

## 2022-11-15 DIAGNOSIS — M15.9 OSTEOARTHRITIS OF MULTIPLE JOINTS, UNSPECIFIED OSTEOARTHRITIS TYPE: ICD-10-CM

## 2022-11-15 DIAGNOSIS — G30.1 LATE ONSET ALZHEIMER'S DEMENTIA WITHOUT BEHAVIORAL DISTURBANCE (H): Primary | ICD-10-CM

## 2022-11-15 NOTE — PROGRESS NOTES
"[facility] GERIATRICS    Chief Complaint   Patient presents with     RECHECK     HPI:  Selena Jacobo is a 89 year old  (1/11/1930), who is being seen today for an episodic care visit at: [facility]) [[id number]].   [place] a higher level of care due to progressive dementia.   Medical history significant for Alzheimer's dementia diagnosed 5/2021 and on donepezil,  osteoporosis, GERD, osteoarthritis, hyperlipidemia.     Today's concerns are:     Late onset Alzheimer's dementia without behavioral disturbance (H)  Age-related osteoporosis without current pathological fracture  Osteoarthritis of multiple joints, unspecified osteoarthritis type  She is unable to provide history. She has cleaned out her purse looking for her car keys. Indicates that she doesn't need to go anywhere, but is anxious about losing her keys. Denies feeling ill. Denies pain. Reports a good appetite. Staff has noticed episodes of increased confusion, but no acute symptoms. She tends to stay in her room and needs encouragement to participate in activities.     Allergies, and PMH/PSH reviewed in EPIC today    REVIEW OF SYSTEMS:  Unable to obtain due to dementia     Objective:   /52   Pulse 80   Temp 97.7  F (36.5  C)   Resp 18   Ht 1.549 m (5' 1\")   Wt 48.5 kg (107 lb)   SpO2 97%   BMI 20.22 kg/m    GENERAL APPEARANCE:  Alert, in no distress   ENT:  [place], oropharynx clear  EYES: conjunctiva and lids normal  NECK:  no adenopathy, no thyromegaly  RESP:  lungs clear to auscultation   CV:  regular rate and rhythm, no murmur, no edema  ABDOMEN:  soft, non-tender, no distension, no masses  M/S:  gait steady. MORALES with good strength. No joint inflammation  SKIN:  no visible rashes or open areas  PSYCH:  oriented to self,  insight and judgement impaired, memory impaired, affect normal     Recent labs in [employer] reviewed by me today.        ASSESSMENT / PLAN:  (G30.1,  F02.80) Late " onset Alzheimer's dementia without behavioral disturbance (H)  (primary encounter diagnosis)  Comment: severe deficits with episodes of increased confusion. BIMS 0/15. No s/s of an acute process. Desirable weight gain of 10 lbs in the past 6 months   She saw Dr Asiya Garzon in the Memory Clinic 9/19/2022 with no changes recommended.   Plan: continue donepezil. Memory care staff assistance with cares, meals, activities, med admin.     (M81.0) Age-related osteoporosis without current pathological fracture  Comment: remains ambulatory. No falls   Plan: continue calcium, vitamin D. Next dose Prolia due 3/2023.     (M15.9) Osteoarthritis of multiple joints, unspecified osteoarthritis type  Comment: appears comfortable with mobility   Plan: tylenol prn         Electronically signed by: CHARLES Turcios CNP

## 2022-11-15 NOTE — LETTER
"    11/15/2022        RE: Selena Jacobo  [facility] [address]  [address]  [place] [address]        [facility] GERIATRICS    Chief Complaint   Patient presents with     RECHECK     HPI:  Selena Jacobo is a 89 year old  (1/11/1930), who is being seen today for an episodic care visit at: [facility] ([facility]) [[id number]].   [place] a higher level of care due to progressive dementia.   Medical history significant for Alzheimer's dementia diagnosed 5/2021 and on donepezil,  osteoporosis, GERD, osteoarthritis, hyperlipidemia.     Today's concerns are:     Late onset Alzheimer's dementia without behavioral disturbance (H)  Age-related osteoporosis without current pathological fracture  Osteoarthritis of multiple joints, unspecified osteoarthritis type  She is unable to provide history. She has cleaned out her purse looking for her car keys. Indicates that she doesn't need to go anywhere, but is anxious about losing her keys. Denies feeling ill. Denies pain. Reports a good appetite. Staff has noticed episodes of increased confusion, but no acute symptoms. She tends to stay in her room and needs encouragement to participate in activities.     Allergies, and PMH/PSH reviewed in EPIC today    REVIEW OF SYSTEMS:  Unable to obtain due to dementia     Objective:   /52   Pulse 80   Temp 97.7  F (36.5  C)   Resp 18   Ht 1.549 m (5' 1\")   Wt 48.5 kg (107 lb)   SpO2 97%   BMI 20.22 kg/m    GENERAL APPEARANCE:  Alert, in no distress   ENT:  [place], oropharynx clear  EYES: conjunctiva and lids normal  NECK:  no adenopathy, no thyromegaly  RESP:  lungs clear to auscultation   CV:  regular rate and rhythm, no murmur, no edema  ABDOMEN:  soft, non-tender, no distension, no masses  M/S:  gait steady. MORALES with good strength. No joint inflammation  SKIN:  no visible rashes or open areas  PSYCH:  oriented to self,  insight and judgement impaired, memory " impaired, affect normal     Recent labs in [facility] reviewed by me today.        ASSESSMENT / PLAN:  (G30.1,  F02.80) Late onset Alzheimer's dementia without behavioral disturbance (H)  (primary encounter diagnosis)  Comment: severe deficits with episodes of increased confusion. BIMS 0/15. No s/s of an acute process. Desirable weight gain of 10 lbs in the past 6 months   She saw Dr Asiya Garzon in the Memory Clinic 9/19/2022 with no changes recommended.   Plan: continue donepezil. Memory care staff assistance with cares, meals, activities, med admin.     (M81.0) Age-related osteoporosis without current pathological fracture  Comment: remains ambulatory. No falls   Plan: continue calcium, vitamin D. Next dose Prolia due 3/2023.     (M15.9) Osteoarthritis of multiple joints, unspecified osteoarthritis type  Comment: appears comfortable with mobility   Plan: tylenol prn         Electronically signed by: CHARLES Turcios CNP             Sincerely,        CHARLES Turcios CNP

## 2022-11-19 RX ORDER — CARBOXYMETHYLCELLULOSE SODIUM 10 MG/ML
1 SOLUTION/ DROPS OPHTHALMIC 2 TIMES DAILY
COMMUNITY

## 2022-12-22 ENCOUNTER — DOCUMENTATION ONLY (OUTPATIENT)
Dept: GERIATRICS | Facility: CLINIC | Age: 87
End: 2022-12-22

## 2022-12-22 NOTE — PROGRESS NOTES
[facility] is no longer following patients at [facility]. Patient's care has been assumed by the onsite [facility] team.      CHARLES Turcios CNP on 12/22/2022 at 3:16 PM

## 2023-06-01 ENCOUNTER — HEALTH MAINTENANCE LETTER (OUTPATIENT)
Age: 88
End: 2023-06-01

## 2023-06-05 ENCOUNTER — APPOINTMENT (OUTPATIENT)
Dept: CT IMAGING | Facility: CLINIC | Age: 88
End: 2023-06-05
Attending: EMERGENCY MEDICINE
Payer: COMMERCIAL

## 2023-06-05 ENCOUNTER — HOSPITAL ENCOUNTER (EMERGENCY)
Facility: CLINIC | Age: 88
Discharge: HOME | End: 2023-06-05
Attending: EMERGENCY MEDICINE | Admitting: EMERGENCY MEDICINE
Payer: COMMERCIAL

## 2023-06-05 ENCOUNTER — APPOINTMENT (OUTPATIENT)
Dept: GENERAL RADIOLOGY | Facility: CLINIC | Age: 88
End: 2023-06-05
Attending: EMERGENCY MEDICINE
Payer: COMMERCIAL

## 2023-06-05 VITALS
HEART RATE: 56 BPM | SYSTOLIC BLOOD PRESSURE: 154 MMHG | DIASTOLIC BLOOD PRESSURE: 124 MMHG | TEMPERATURE: 97.6 F | RESPIRATION RATE: 11 BRPM | OXYGEN SATURATION: 99 %

## 2023-06-05 DIAGNOSIS — S70.01XA CONTUSION OF RIGHT HIP, INITIAL ENCOUNTER: ICD-10-CM

## 2023-06-05 DIAGNOSIS — M25.511 ACUTE PAIN OF RIGHT SHOULDER: ICD-10-CM

## 2023-06-05 DIAGNOSIS — Z86.59 HISTORY OF DEMENTIA: ICD-10-CM

## 2023-06-05 LAB
ALBUMIN SERPL BCG-MCNC: 3.8 G/DL (ref 3.5–5.2)
ALP SERPL-CCNC: 65 U/L (ref 35–104)
ALT SERPL W P-5'-P-CCNC: 13 U/L (ref 10–35)
ANION GAP SERPL CALCULATED.3IONS-SCNC: 8 MMOL/L (ref 7–15)
AST SERPL W P-5'-P-CCNC: 19 U/L (ref 10–35)
ATRIAL RATE - MUSE: 49 BPM
BASOPHILS # BLD AUTO: 0 10E3/UL (ref 0–0.2)
BASOPHILS NFR BLD AUTO: 1 %
BILIRUB SERPL-MCNC: 0.3 MG/DL
BUN SERPL-MCNC: 25.3 MG/DL (ref 8–23)
CALCIUM SERPL-MCNC: 9.7 MG/DL (ref 8.2–9.6)
CHLORIDE SERPL-SCNC: 107 MMOL/L (ref 98–107)
CREAT SERPL-MCNC: 0.64 MG/DL (ref 0.51–0.95)
DEPRECATED HCO3 PLAS-SCNC: 26 MMOL/L (ref 22–29)
DIASTOLIC BLOOD PRESSURE - MUSE: NORMAL MMHG
EOSINOPHIL # BLD AUTO: 0.2 10E3/UL (ref 0–0.7)
EOSINOPHIL NFR BLD AUTO: 3 %
ERYTHROCYTE [DISTWIDTH] IN BLOOD BY AUTOMATED COUNT: 12 % (ref 10–15)
GFR SERPL CREATININE-BSD FRML MDRD: 82 ML/MIN/1.73M2
GLUCOSE SERPL-MCNC: 97 MG/DL (ref 70–99)
HCT VFR BLD AUTO: 35.9 % (ref 35–47)
HGB BLD-MCNC: 11.8 G/DL (ref 11.7–15.7)
HOLD SPECIMEN: NORMAL
IMM GRANULOCYTES # BLD: 0.1 10E3/UL
IMM GRANULOCYTES NFR BLD: 1 %
INTERPRETATION ECG - MUSE: NORMAL
LYMPHOCYTES # BLD AUTO: 1.1 10E3/UL (ref 0.8–5.3)
LYMPHOCYTES NFR BLD AUTO: 19 %
MCH RBC QN AUTO: 33.8 PG (ref 26.5–33)
MCHC RBC AUTO-ENTMCNC: 32.9 G/DL (ref 31.5–36.5)
MCV RBC AUTO: 103 FL (ref 78–100)
MONOCYTES # BLD AUTO: 0.5 10E3/UL (ref 0–1.3)
MONOCYTES NFR BLD AUTO: 9 %
NEUTROPHILS # BLD AUTO: 4.1 10E3/UL (ref 1.6–8.3)
NEUTROPHILS NFR BLD AUTO: 67 %
NRBC # BLD AUTO: 0 10E3/UL
NRBC BLD AUTO-RTO: 0 /100
P AXIS - MUSE: 71 DEGREES
PLATELET # BLD AUTO: 173 10E3/UL (ref 150–450)
POTASSIUM SERPL-SCNC: 4.5 MMOL/L (ref 3.4–5.3)
PR INTERVAL - MUSE: 134 MS
PROT SERPL-MCNC: 6.8 G/DL (ref 6.4–8.3)
QRS DURATION - MUSE: 90 MS
QT - MUSE: 470 MS
QTC - MUSE: 424 MS
R AXIS - MUSE: 75 DEGREES
RBC # BLD AUTO: 3.49 10E6/UL (ref 3.8–5.2)
SODIUM SERPL-SCNC: 141 MMOL/L (ref 136–145)
SYSTOLIC BLOOD PRESSURE - MUSE: NORMAL MMHG
T AXIS - MUSE: 94 DEGREES
VENTRICULAR RATE- MUSE: 49 BPM
WBC # BLD AUTO: 6.1 10E3/UL (ref 4–11)

## 2023-06-05 PROCEDURE — 73080 X-RAY EXAM OF ELBOW: CPT | Mod: RT

## 2023-06-05 PROCEDURE — 73030 X-RAY EXAM OF SHOULDER: CPT | Mod: RT

## 2023-06-05 PROCEDURE — 72192 CT PELVIS W/O DYE: CPT

## 2023-06-05 PROCEDURE — 36415 COLL VENOUS BLD VENIPUNCTURE: CPT | Performed by: EMERGENCY MEDICINE

## 2023-06-05 PROCEDURE — 85004 AUTOMATED DIFF WBC COUNT: CPT | Performed by: EMERGENCY MEDICINE

## 2023-06-05 PROCEDURE — 99285 EMERGENCY DEPT VISIT HI MDM: CPT | Mod: 25

## 2023-06-05 PROCEDURE — 80053 COMPREHEN METABOLIC PANEL: CPT | Performed by: EMERGENCY MEDICINE

## 2023-06-05 PROCEDURE — 93005 ELECTROCARDIOGRAM TRACING: CPT

## 2023-06-05 ASSESSMENT — ACTIVITIES OF DAILY LIVING (ADL)
ADLS_ACUITY_SCORE: 35
ADLS_ACUITY_SCORE: 37
ADLS_ACUITY_SCORE: 35

## 2023-06-05 NOTE — ED TRIAGE NOTES
Per EMS: Pt had unwitnessed fall last week and has been complaining of worsening R shoulder and hip pain. VSS on RA. Pt is confused and from memory care. Oxy given at facility w/ some relief. Pain is at a 10/10     Triage Assessment     Row Name 06/05/23 1052       Triage Assessment (Adult)    Airway WDL WDL       Respiratory WDL    Respiratory WDL all    Rhythm/Pattern, Respiratory pattern regular;unlabored;depth regular       Peripheral/Neurovascular WDL    Peripheral Neurovascular WDL WDL

## 2023-06-05 NOTE — ED PROVIDER NOTES
History     Chief Complaint:  Fall       HPI   Selena Jacobo is a 89 year old female presenting with concerns of having a fall.  The patient is fairly demented and does not recall this fall, though supposedly it was unwitnessed and occurred somewhere around 1 AM.  The patient was then found unable to bear weight on her right leg and is also complaining of right shoulder and elbow pain.    History is severely limited secondary to patient's dementia.      Independent Historian:   EMS - They report The above history.  I also reviewed notes from her facility which are in epic that discussed her fall and plans to send her in for imaging.    Review of External Notes:   Yes-I reviewed notes from her facility today discussing her fall.  I also reviewed prior notes from her facility in the past month describing chronic shoulder pain and dementia      Medications:    Calcium-Phosphorus-Vitamin D (CITRACAL CALCIUM GUMMIES) 250-115-250 MG-MG-UNIT CHEW  carboxymethylcellulose (ARTIFICIAL TEARS) 1 % ophthalmic solution  denosumab (PROLIA) 60 MG/ML SOSY injection  donepezil (ARICEPT) 5 MG tablet  Multiple Vitamins-Minerals (CERTAVITE SENIOR) TABS  Multiple Vitamins-Minerals (PRESERVISION AREDS 2) CAPS  vitamin C (ASCORBIC ACID) 500 MG tablet        Past Medical History:    Past Medical History:   Diagnosis Date     Dementia (H)      Generalized OA      GERD (gastroesophageal reflux disease)      Hyperlipidemia      Osteoporosis      Rotator cuff tear      UPJ stricture, 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       Past Surgical History:    Past Surgical History:   Procedure Laterality Date     CATARACT IOL, RT/LT          Physical Exam     Patient Vitals for the past 24 hrs:   BP Temp Temp src Pulse Resp SpO2   06/05/23 1430 124/59 -- -- 50 -- 96 %   06/05/23 1413 127/55 -- -- 50 -- 96 %   06/05/23 1412 -- -- -- -- -- 97 %   06/05/23 1153 -- -- -- 51 11 --   06/05/23 1150 -- -- -- 50 -- --   06/05/23 1149 -- -- -- 50 10 --   06/05/23 1140 -- -- --  (!) 49 12 --   06/05/23 1130 133/58 -- -- (!) 49 11 --   06/05/23 1115 -- -- -- 50 10 --   06/05/23 1110 -- -- -- 56 -- --   06/05/23 1100 135/61 -- -- 51 16 97 %   06/05/23 1053 (!) 147/62 97.6  F (36.4  C) Oral 52 15 99 %        Physical Exam   General: Cachectic elderly woman lying supine, interactive but providing minimal history.    Eye:  Pupils are equal, round, and reactive.  Extraocular movements intact.    ENT:  No rhinorrhea.  Moist mucus membranes.  Normal tongue and tonsil.    Cardiac:  Regular rate and rhythm.  No murmurs, gallops, or rubs.    Pulmonary:  Clear to auscultation bilaterally.  No wheezes, rales, or rhonchi.    Abdomen:  Positive bowel sounds.  Abdomen is soft and non-distended, without focal tenderness.    Musculoskeletal: No evidence of skull trauma.  No midline tenderness to the neck or back.  No pain with movement of the left arm or leg.  There is discomfort over the right lateral hip with flexion and extension, though there is no obvious deformity.  There is generalized discomfort with ranging of the right shoulder and the right elbow.    Skin:  Warm and dry without rashes.  There are small skin tears noted over the right elbow and right forearm.    Neurologic:  Non-focal exam without asymmetric weakness or numbness.     Psychiatric: Pleasantly demented.      Emergency Department Course   ECG  EKG was obtained at 11:47 AM.  This shows a sinus bradycardia with a rate of 47.  AL interval is normal at 134 ms.  QRS interval is normal at 90 ms.  There is a normal axis.  There is no ST elevation, depression, or T wave inversion that is concerning for ischemia.  This is unchanged from an EKG from January 2012.  This EKG was read by myself at 1149 a.m.    Imaging:  CT Pelvis Bone wo Contrast   Final Result   IMPRESSION:    1. No acute findings.   2. Osteopenia.   3. Degenerative changes in the hip and SI joints.   4. Bilateral gluteus minimus and medius musculature atrophy.   5. Benign  fibro-osseous lesion in the right sacral ala.      MARY ANDREA MD            SYSTEM ID:  [id number]      XR Shoulder Right G/E 3 Views   Final Result   IMPRESSION: Osteopenia. No acute fractures are evident. Old healed   right posterior rib fractures. Superior subluxation of humeral head   with narrowing humeral acromial distance consistent with chronic   rotator cuff tear. Mild degenerative changes in the glenohumeral   joint. Mild degenerative changes in the acromioclavicular joint.       MARY ANDREA MD            SYSTEM ID:  [id number]      Elbow XR, G/E 3 views, right   Final Result   IMPRESSION: Osteopenia. No fractures are evident. No elbow joint   effusion. Normal alignment.       MARY ANDREA MD            SYSTEM ID:  [id number]         Report per radiology    Laboratory:  Labs Ordered and Resulted from Time of ED Arrival to Time of ED Departure   COMPREHENSIVE METABOLIC PANEL - Abnormal       Result Value    Sodium 141      Potassium 4.5      Chloride 107      Carbon Dioxide (CO2) 26      Anion Gap 8      Urea Nitrogen 25.3 (*)     Creatinine 0.64      Calcium 9.7 (*)     Glucose 97      Alkaline Phosphatase 65      AST 19      ALT 13      Protein Total 6.8      Albumin 3.8      Bilirubin Total 0.3      GFR Estimate 82     CBC WITH PLATELETS AND DIFFERENTIAL - Abnormal    WBC Count 6.1      RBC Count 3.49 (*)     Hemoglobin 11.8      Hematocrit 35.9       (*)     MCH 33.8 (*)     MCHC 32.9      RDW 12.0      Platelet Count 173      % Neutrophils 67      % Lymphocytes 19      % Monocytes 9      % Eosinophils 3      % Basophils 1      % Immature Granulocytes 1      NRBCs per 100 WBC 0      Absolute Neutrophils 4.1      Absolute Lymphocytes 1.1      Absolute Monocytes 0.5      Absolute Eosinophils 0.2      Absolute Basophils 0.0      Absolute Immature Granulocytes 0.1      Absolute NRBCs 0.0          Emergency Department Course & Assessments:    Interventions:  Medications - No data to  display       Independent Interpretation (X-rays, CTs, rhythm strip):  Yes-I reviewed the patient's shoulder and elbow x-ray.    Consultations/Discussion of Management or Tests:  None        Social Determinants of Health affecting care:   Advanced dementia    Disposition:  The patient was discharged to home.     Impression & Plan    CMS Diagnoses: None    Medical Decision Making:  This severely demented woman presents after an unwitnessed fall last night.  She presented today because of right hip and arm pain.  Imaging studies are reassuring.  Patient is ambulatory without any assistance and is feeling improved, able to return to her facility.  Lab work is reassuring.  There is no evidence of head trauma.  We spoke with the facility and she will be returned there for close watch and further evaluation    Diagnosis:    ICD-10-CM    1. Contusion of right hip, initial encounter  S70.01XA       2. Acute pain of right shoulder  M25.511       3. History of dementia  Z86.59                 6/5/2023   Trierweiler, Chad A, MD Trierweiler, Chad A, MD  06/05/23 1502

## 2023-06-05 NOTE — ED NOTES
Bed: ED24  Expected date:   Expected time:   Means of arrival:   Comments:  Silvestre - 520 - 89 F fall shoulder pain eta 1031

## 2024-01-29 ENCOUNTER — HOSPITAL ENCOUNTER (EMERGENCY)
Facility: CLINIC | Age: 89
Discharge: NURSING, REHAB OR LONG-TERM CARE | End: 2024-01-30
Attending: STUDENT IN AN ORGANIZED HEALTH CARE EDUCATION/TRAINING PROGRAM | Admitting: STUDENT IN AN ORGANIZED HEALTH CARE EDUCATION/TRAINING PROGRAM
Payer: COMMERCIAL

## 2024-01-29 ENCOUNTER — APPOINTMENT (OUTPATIENT)
Dept: CT IMAGING | Facility: CLINIC | Age: 89
End: 2024-01-29
Attending: STUDENT IN AN ORGANIZED HEALTH CARE EDUCATION/TRAINING PROGRAM
Payer: COMMERCIAL

## 2024-01-29 VITALS
RESPIRATION RATE: 20 BRPM | TEMPERATURE: 98 F | DIASTOLIC BLOOD PRESSURE: 62 MMHG | HEART RATE: 79 BPM | SYSTOLIC BLOOD PRESSURE: 150 MMHG | OXYGEN SATURATION: 96 %

## 2024-01-29 DIAGNOSIS — S00.03XA CONTUSION OF SCALP, INITIAL ENCOUNTER: ICD-10-CM

## 2024-01-29 DIAGNOSIS — N30.00 ACUTE CYSTITIS WITHOUT HEMATURIA: Primary | ICD-10-CM

## 2024-01-29 DIAGNOSIS — S09.90XA HEAD INJURY, INITIAL ENCOUNTER: ICD-10-CM
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
## 2024-01-29 DIAGNOSIS — S01.01XA SCALP LACERATION, INITIAL ENCOUNTER: ICD-10-CM

## 2024-01-29 LAB
ALBUMIN UR-MCNC: 20 MG/DL
ANION GAP SERPL CALCULATED.3IONS-SCNC: 8 MMOL/L (ref 7–15)
APPEARANCE UR: CLEAR
BASOPHILS # BLD AUTO: 0 10E3/UL (ref 0–0.2)
BASOPHILS NFR BLD AUTO: 0 %
BILIRUB UR QL STRIP: NEGATIVE
BUN SERPL-MCNC: 41.6 MG/DL (ref 8–23)
CALCIUM SERPL-MCNC: 9.8 MG/DL (ref 8.2–9.6)
CHLORIDE SERPL-SCNC: 104 MMOL/L (ref 98–107)
CK SERPL-CCNC: 51 U/L (ref 26–192)
COLOR UR AUTO: YELLOW
CREAT SERPL-MCNC: 0.68 MG/DL (ref 0.51–0.95)
DEPRECATED HCO3 PLAS-SCNC: 29 MMOL/L (ref 22–29)
EGFRCR SERPLBLD CKD-EPI 2021: 80 ML/MIN/1.73M2
EOSINOPHIL # BLD AUTO: 0.1 10E3/UL (ref 0–0.7)
EOSINOPHIL NFR BLD AUTO: 1 %
ERYTHROCYTE [DISTWIDTH] IN BLOOD BY AUTOMATED COUNT: 12.1 % (ref 10–15)
GLUCOSE SERPL-MCNC: 141 MG/DL (ref 70–99)
GLUCOSE UR STRIP-MCNC: NEGATIVE MG/DL
HCT VFR BLD AUTO: 33 % (ref 35–47)
HGB BLD-MCNC: 10.8 G/DL (ref 11.7–15.7)
HGB UR QL STRIP: NEGATIVE
IMM GRANULOCYTES # BLD: 0.1 10E3/UL
IMM GRANULOCYTES NFR BLD: 1 %
KETONES UR STRIP-MCNC: NEGATIVE MG/DL
LEUKOCYTE ESTERASE UR QL STRIP: ABNORMAL
LYMPHOCYTES # BLD AUTO: 1.2 10E3/UL (ref 0.8–5.3)
LYMPHOCYTES NFR BLD AUTO: 17 %
MCH RBC QN AUTO: 33.9 PG (ref 26.5–33)
MCHC RBC AUTO-ENTMCNC: 32.7 G/DL (ref 31.5–36.5)
MCV RBC AUTO: 103 FL (ref 78–100)
MONOCYTES # BLD AUTO: 0.5 10E3/UL (ref 0–1.3)
MONOCYTES NFR BLD AUTO: 8 %
NEUTROPHILS # BLD AUTO: 5.1 10E3/UL (ref 1.6–8.3)
NEUTROPHILS NFR BLD AUTO: 73 %
NITRATE UR QL: NEGATIVE
NRBC # BLD AUTO: 0 10E3/UL
NRBC BLD AUTO-RTO: 0 /100
PH UR STRIP: 6.5 [PH] (ref 5–7)
PLATELET # BLD AUTO: 157 10E3/UL (ref 150–450)
POTASSIUM SERPL-SCNC: 5 MMOL/L (ref 3.4–5.3)
RBC # BLD AUTO: 3.19 10E6/UL (ref 3.8–5.2)
RBC URINE: 10 /HPF
SODIUM SERPL-SCNC: 141 MMOL/L (ref 135–145)
SP GR UR STRIP: 1.02 (ref 1–1.03)
UROBILINOGEN UR STRIP-MCNC: NORMAL MG/DL
WBC # BLD AUTO: 7 10E3/UL (ref 4–11)
WBC URINE: 114 /HPF

## 2024-01-29 PROCEDURE — 70450 CT HEAD/BRAIN W/O DYE: CPT

## 2024-01-29 PROCEDURE — 81001 URINALYSIS AUTO W/SCOPE: CPT | Performed by: STUDENT IN AN ORGANIZED HEALTH CARE EDUCATION/TRAINING PROGRAM

## 2024-01-29 PROCEDURE — 72125 CT NECK SPINE W/O DYE: CPT

## 2024-01-29 PROCEDURE — 99285 EMERGENCY DEPT VISIT HI MDM: CPT | Mod: 25

## 2024-01-29 PROCEDURE — 80048 BASIC METABOLIC PNL TOTAL CA: CPT | Performed by: STUDENT IN AN ORGANIZED HEALTH CARE EDUCATION/TRAINING PROGRAM

## 2024-01-29 PROCEDURE — 82550 ASSAY OF CK (CPK): CPT | Performed by: STUDENT IN AN ORGANIZED HEALTH CARE EDUCATION/TRAINING PROGRAM

## 2024-01-29 PROCEDURE — 85041 AUTOMATED RBC COUNT: CPT | Performed by: STUDENT IN AN ORGANIZED HEALTH CARE EDUCATION/TRAINING PROGRAM

## 2024-01-29 PROCEDURE — 87086 URINE CULTURE/COLONY COUNT: CPT | Performed by: STUDENT IN AN ORGANIZED HEALTH CARE EDUCATION/TRAINING PROGRAM

## 2024-01-29 PROCEDURE — 36415 COLL VENOUS BLD VENIPUNCTURE: CPT | Performed by: STUDENT IN AN ORGANIZED HEALTH CARE EDUCATION/TRAINING PROGRAM

## 2024-01-29 RX ORDER — CEPHALEXIN 500 MG/1
500 CAPSULE ORAL 4 TIMES DAILY
Qty: 28 CAPSULE | Refills: 0 | Status: SHIPPED | OUTPATIENT
Start: 2024-01-29 | End: 2024-02-05

## 2024-01-29 ASSESSMENT — ACTIVITIES OF DAILY LIVING (ADL)
ADLS_ACUITY_SCORE: 35

## 2024-01-30 NOTE — DISCHARGE INSTRUCTIONS
Thank you for allowing us to evaluate you today.  Follow up with primary care clinician  in 1 week for reevaluation..  Use the antibiotic(s) as prescribed.   Drink plenty of fluids.  Please read the guidance provided with your discharge instructions.  Immediately return to the emergency department with any concerns.

## 2024-01-30 NOTE — ED PROVIDER NOTES
History   Chief Complaint:  Fall       HPI:  Selena Jacobo is a very pleasant 89 year old female presenting following a fall. Per report, she comes from [facility] in the memory care unit. She had an unwitnessed fall in her bathroom, staff found on ground.  She was last seen 2 hours previous.  Unknown downtime.  No Blood thinners. Endorsed back and neck pain for EMS, 25 of fentanyl given in route. Arrived in a C-collar. History of dementia.    Independent Historian:  Independent history was obtained from EMS. They provided information detailed above in HPI.     Review of External Notes:  I personally reviewed notes from the patient's clinic visit dated  1/3/24 . This provided me with information regarding patient's baseline medical problems and patient's recent clinical course.     I personally reviewed the patient's chart, including available medication list and available past medical history, past surgical history, family history, and social history.    Physical Exam   Patient Vitals for the past 24 hrs:   BP Temp Temp src Pulse Resp SpO2   01/29/24 2130 137/60 -- -- -- -- --   01/29/24 1833 (!) 174/73 98  F (36.7  C) Oral 79 20 98 %      Physical Exam  Vitals and nursing note reviewed.   Constitutional:       Appearance: She is not ill-appearing.   HENT:      Head: Abrasion and contusion present. No raccoon eyes or Page's sign.      Comments: Contusion to left forehead with overlying abrasion     Nose: Nose normal.      Mouth/Throat:      Mouth: Mucous membranes are moist.   Eyes:      Pupils: Pupils are equal, round, and reactive to light.   Cardiovascular:      Rate and Rhythm: Normal rate.   Pulmonary:      Effort: Pulmonary effort is normal.      Breath sounds: Normal breath sounds.   Musculoskeletal:         General: No swelling, tenderness, deformity or signs of injury. Normal range of motion.      Cervical back: No tenderness.   Skin:     General: Skin is warm and dry.       Findings: No bruising.   Neurological:      Mental Status: She is alert. Mental status is at baseline.            Emergency Department Course     Imaging & ECG: Laboratory:       CT Head w/o Contrast   Final Result   IMPRESSION:   1.  No CT evidence for acute intracranial process.   2.  Brain atrophy and presumed chronic microvascular ischemic changes as above.      CT Cervical Spine w/o Contrast   Final Result   IMPRESSION:   1.  No fracture or posttraumatic subluxation.   2.  Diffuse degenerative change of the cervical spine.   3.  No high-grade spinal canal stenosis.         Report per radiology.   Labs Ordered and Resulted from Time of ED Arrival to Time of ED Departure   BASIC METABOLIC PANEL - Abnormal       Result Value    Sodium 141      Potassium 5.0      Chloride 104      Carbon Dioxide (CO2) 29      Anion Gap 8      Urea Nitrogen 41.6 (*)     Creatinine 0.68      GFR Estimate 80      Calcium 9.8 (*)     Glucose 141 (*)    ROUTINE UA WITH MICROSCOPIC REFLEX TO CULTURE - Abnormal    Color Urine Yellow      Appearance Urine Clear      Glucose Urine Negative      Bilirubin Urine Negative      Ketones Urine Negative      Specific Gravity Urine 1.023      Blood Urine Negative      pH Urine 6.5      Protein Albumin Urine 20 (*)     Urobilinogen Urine Normal      Nitrite Urine Negative      Leukocyte Esterase Urine Large (*)     RBC Urine 10 (*)     WBC Urine 114 (*)    CBC WITH PLATELETS AND DIFFERENTIAL - Abnormal    WBC Count 7.0      RBC Count 3.19 (*)     Hemoglobin 10.8 (*)     Hematocrit 33.0 (*)      (*)     MCH 33.9 (*)     MCHC 32.7      RDW 12.1      Platelet Count 157      % Neutrophils 73      % Lymphocytes 17      % Monocytes 8      % Eosinophils 1      % Basophils 0      % Immature Granulocytes 1      NRBCs per 100 WBC 0      Absolute Neutrophils 5.1      Absolute Lymphocytes 1.2      Absolute Monocytes 0.5      Absolute Eosinophils 0.1      Absolute Basophils 0.0      Absolute Immature  Granulocytes 0.1      Absolute NRBCs 0.0     CK TOTAL - Normal    CK 51     URINE CULTURE         Procedures  None performed    Interventions & Assessments:    Interventions:  Medications - No data to display     Assessments:  1840 I obtained history and performed initial assessment of the patient.   2020 I rechecked and updated the patient.    Independent Interpretation (X-rays, CTs, rhythm strip):  I independently interpreted the patient's non-contrast head CT; reassuring against acute intracranial hemorrhage.    Consultations/Discussion of Management or Tests:  None    Social Determinants of Health affecting care:   None.    Disposition:  The patient was discharged to home.     Impression & Plan     Medical Decision Making:  Patient presenting with fall, head strike, and contusion.  Vital signs are reassuring upon arrival.  Unable to obtain significant history from the patient given her baseline dementia.  Given risk for intracranial hemorrhage or cervical spinal fracture or subluxation, we did obtain CT imaging of head and cervical spine.  These were reassuring.  With unknown etiology of fall and downtime of up to 2 hours, did obtain labs including urinalysis to evaluate for urinary tract infection, CK to evaluate for rhabdomyolysis, BMP to evaluate for electrolyte derangement or renal dysfunction, along with CBC.  These were reassuring with the exception of evidence of urinary tract infection.  Feel patient is appropriate to discharge back to her facility given this reassuring workup.  We will start her on cephalexin for UTI.  Urine culture has been sent.  I did discuss findings and workup with the patient's granddaughter at bedside.    Diagnosis:    ICD-10-CM    1. Acute cystitis without hematuria  N30.00       2. Fall, initial encounter  W19.XXXA       3. Head injury, initial encounter  S09.90XA       4. Scalp laceration, initial encounter  S01.01XA       5. Contusion of scalp, initial encounter  S00.03XA             Discharge Medications:  New Prescriptions    CEPHALEXIN (KEFLEX) 500 MG CAPSULE    Take 1 capsule (500 mg) by mouth 4 times daily for 7 days          Braydon Pacheco MD  01/29/24 6989

## 2024-01-30 NOTE — ED TRIAGE NOTES
Patient presents via EMS. Per report, patient comes from [facility] in the memory care unit. Patient had an unwitnessed fall in her bathroom, staff found on ground. No Blood thinners. Endorsed back and neck pain for EMS, 25 of fentanyl given in route. Arrived in a C collar. Hx of dementia,      Triage Assessment (Adult)       Row Name 01/29/24 1580          Triage Assessment    Airway WDL WDL        Respiratory WDL    Respiratory WDL WDL        Skin Circulation/Temperature WDL    Skin Circulation/Temperature WDL X        Cardiac WDL    Cardiac WDL WDL        Peripheral/Neurovascular WDL    Peripheral Neurovascular WDL WDL        Cognitive/Neuro/Behavioral WDL    Cognitive/Neuro/Behavioral WDL X

## 2024-01-30 NOTE — ED NOTES
Bed: ED25  Expected date:   Expected time:   Means of arrival:   Comments:  Silvestre HernandezF Fall/Laceration

## 2024-01-31 LAB — BACTERIA UR CULT: ABNORMAL

## 2024-01-31 NOTE — RESULT ENCOUNTER NOTE
Final Urine Culture Report on 1/31/24  [facility] Emergency Dept discharge antibiotic prescribed: Cephalexin (Keflex) 500 mg capsule, 1 capsule (500 mg) by mouth 4 times daily for 7 days.  #1. Bacteria, 50,000 - 100,000 CFU/ML aerococcus urinae is SUSCEPTIBLE to Antibiotic.    No change in treatment per [facility] ED lab result Urine Culture protocol.

## 2024-06-02 ENCOUNTER — HEALTH MAINTENANCE LETTER (OUTPATIENT)
Age: 89
End: 2024-06-02

## 2025-06-14 ENCOUNTER — HEALTH MAINTENANCE LETTER (OUTPATIENT)
Age: OVER 89
End: 2025-06-14